# Patient Record
Sex: MALE | Race: WHITE | NOT HISPANIC OR LATINO | Employment: UNEMPLOYED | URBAN - NONMETROPOLITAN AREA
[De-identification: names, ages, dates, MRNs, and addresses within clinical notes are randomized per-mention and may not be internally consistent; named-entity substitution may affect disease eponyms.]

---

## 2017-04-20 ENCOUNTER — APPOINTMENT (OUTPATIENT)
Dept: CT IMAGING | Facility: HOSPITAL | Age: 22
End: 2017-04-20

## 2017-04-20 ENCOUNTER — HOSPITAL ENCOUNTER (OUTPATIENT)
Facility: HOSPITAL | Age: 22
Setting detail: OBSERVATION
Discharge: LEFT BEFORE DISCHARGE | End: 2017-04-21
Attending: EMERGENCY MEDICINE | Admitting: HOSPITALIST

## 2017-04-20 ENCOUNTER — APPOINTMENT (OUTPATIENT)
Dept: GENERAL RADIOLOGY | Facility: HOSPITAL | Age: 22
End: 2017-04-20

## 2017-04-20 DIAGNOSIS — L03.114 CELLULITIS OF LEFT UPPER EXTREMITY: Primary | ICD-10-CM

## 2017-04-20 DIAGNOSIS — J18.9 PNEUMONIA OF RIGHT MIDDLE LOBE DUE TO INFECTIOUS ORGANISM: ICD-10-CM

## 2017-04-20 LAB
ALBUMIN SERPL-MCNC: 4 G/DL (ref 3.4–4.8)
ALBUMIN/GLOB SERPL: 1.3 G/DL (ref 1.1–1.8)
ALP SERPL-CCNC: 63 U/L (ref 38–126)
ALT SERPL W P-5'-P-CCNC: 26 U/L (ref 21–72)
ANION GAP SERPL CALCULATED.3IONS-SCNC: 14 MMOL/L (ref 5–15)
AST SERPL-CCNC: 21 U/L (ref 17–59)
B PERT DNA SPEC QL NAA+PROBE: NOT DETECTED
BASOPHILS # BLD AUTO: 0.02 10*3/MM3 (ref 0–0.2)
BASOPHILS NFR BLD AUTO: 0.1 % (ref 0–2)
BILIRUB SERPL-MCNC: 1.2 MG/DL (ref 0.2–1.3)
BUN BLD-MCNC: 13 MG/DL (ref 7–21)
BUN/CREAT SERPL: 16.3 (ref 7–25)
C PNEUM DNA NPH QL NAA+NON-PROBE: NOT DETECTED
CALCIUM SPEC-SCNC: 9 MG/DL (ref 8.4–10.2)
CHLORIDE SERPL-SCNC: 93 MMOL/L (ref 95–110)
CO2 SERPL-SCNC: 22 MMOL/L (ref 22–31)
CREAT BLD-MCNC: 0.8 MG/DL (ref 0.7–1.3)
D-LACTATE SERPL-SCNC: 1.8 MMOL/L (ref 0.5–2)
DEPRECATED RDW RBC AUTO: 40.3 FL (ref 35.1–43.9)
EOSINOPHIL # BLD AUTO: 0 10*3/MM3 (ref 0–0.7)
EOSINOPHIL NFR BLD AUTO: 0 % (ref 0–7)
ERYTHROCYTE [DISTWIDTH] IN BLOOD BY AUTOMATED COUNT: 13.4 % (ref 11.5–14.5)
FLUAV H1 2009 PAND RNA NPH QL NAA+PROBE: NOT DETECTED
FLUAV H1 HA GENE NPH QL NAA+PROBE: NOT DETECTED
FLUAV H3 RNA NPH QL NAA+PROBE: NOT DETECTED
FLUAV SUBTYP SPEC NAA+PROBE: NOT DETECTED
FLUBV RNA ISLT QL NAA+PROBE: NOT DETECTED
GFR SERPL CREATININE-BSD FRML MDRD: 121 ML/MIN/1.73 (ref 77–179)
GLOBULIN UR ELPH-MCNC: 3 GM/DL (ref 2.3–3.5)
GLUCOSE BLD-MCNC: 124 MG/DL (ref 60–100)
HADV DNA SPEC NAA+PROBE: NOT DETECTED
HCOV 229E RNA SPEC QL NAA+PROBE: NOT DETECTED
HCOV HKU1 RNA SPEC QL NAA+PROBE: NOT DETECTED
HCOV NL63 RNA SPEC QL NAA+PROBE: NOT DETECTED
HCOV OC43 RNA SPEC QL NAA+PROBE: NOT DETECTED
HCT VFR BLD AUTO: 41.4 % (ref 39–49)
HGB BLD-MCNC: 14.7 G/DL (ref 13.7–17.3)
HMPV RNA NPH QL NAA+NON-PROBE: NOT DETECTED
HOLD SPECIMEN: NORMAL
HOLD SPECIMEN: NORMAL
HPIV1 RNA SPEC QL NAA+PROBE: NOT DETECTED
HPIV2 RNA SPEC QL NAA+PROBE: NOT DETECTED
HPIV3 RNA NPH QL NAA+PROBE: NOT DETECTED
HPIV4 P GENE NPH QL NAA+PROBE: NOT DETECTED
IMM GRANULOCYTES # BLD: 0.12 10*3/MM3 (ref 0–0.02)
IMM GRANULOCYTES NFR BLD: 0.5 % (ref 0–0.5)
L PNEUMO1 AG UR QL IA: NEGATIVE
LYMPHOCYTES # BLD AUTO: 1.36 10*3/MM3 (ref 0.6–4.2)
LYMPHOCYTES NFR BLD AUTO: 6.1 % (ref 10–50)
M PNEUMO IGG SER IA-ACNC: NOT DETECTED
MCH RBC QN AUTO: 29.6 PG (ref 26.5–34)
MCHC RBC AUTO-ENTMCNC: 35.5 G/DL (ref 31.5–36.3)
MCV RBC AUTO: 83.5 FL (ref 80–98)
MONOCYTES # BLD AUTO: 2.75 10*3/MM3 (ref 0–0.9)
MONOCYTES NFR BLD AUTO: 12.4 % (ref 0–12)
NEUTROPHILS # BLD AUTO: 17.87 10*3/MM3 (ref 2–8.6)
NEUTROPHILS NFR BLD AUTO: 80.9 % (ref 37–80)
PLATELET # BLD AUTO: 225 10*3/MM3 (ref 150–450)
PMV BLD AUTO: 10.3 FL (ref 8–12)
POTASSIUM BLD-SCNC: 4.3 MMOL/L (ref 3.5–5.1)
PROT SERPL-MCNC: 7 G/DL (ref 6.3–8.6)
RBC # BLD AUTO: 4.96 10*6/MM3 (ref 4.37–5.74)
RHINOVIRUS RNA SPEC NAA+PROBE: NOT DETECTED
RSV RNA NPH QL NAA+NON-PROBE: NOT DETECTED
S PNEUM AG SPEC QL LA: NEGATIVE
SODIUM BLD-SCNC: 129 MMOL/L (ref 137–145)
WBC NRBC COR # BLD: 22.12 10*3/MM3 (ref 3.2–9.8)
WHOLE BLOOD HOLD SPECIMEN: NORMAL
WHOLE BLOOD HOLD SPECIMEN: NORMAL

## 2017-04-20 PROCEDURE — 25010000002 VANCOMYCIN PER 500 MG: Performed by: NURSE PRACTITIONER

## 2017-04-20 PROCEDURE — G0378 HOSPITAL OBSERVATION PER HR: HCPCS

## 2017-04-20 PROCEDURE — 25010000002 KETOROLAC TROMETHAMINE PER 15 MG: Performed by: FAMILY MEDICINE

## 2017-04-20 PROCEDURE — 94760 N-INVAS EAR/PLS OXIMETRY 1: CPT

## 2017-04-20 PROCEDURE — 87077 CULTURE AEROBIC IDENTIFY: CPT | Performed by: NURSE PRACTITIONER

## 2017-04-20 PROCEDURE — 87486 CHLMYD PNEUM DNA AMP PROBE: CPT | Performed by: FAMILY MEDICINE

## 2017-04-20 PROCEDURE — 96375 TX/PRO/DX INJ NEW DRUG ADDON: CPT

## 2017-04-20 PROCEDURE — 87147 CULTURE TYPE IMMUNOLOGIC: CPT | Performed by: NURSE PRACTITIONER

## 2017-04-20 PROCEDURE — 73090 X-RAY EXAM OF FOREARM: CPT

## 2017-04-20 PROCEDURE — 87040 BLOOD CULTURE FOR BACTERIA: CPT | Performed by: NURSE PRACTITIONER

## 2017-04-20 PROCEDURE — 83605 ASSAY OF LACTIC ACID: CPT | Performed by: NURSE PRACTITIONER

## 2017-04-20 PROCEDURE — 25010000002 CEFTRIAXONE: Performed by: FAMILY MEDICINE

## 2017-04-20 PROCEDURE — 87186 SC STD MICRODIL/AGAR DIL: CPT | Performed by: NURSE PRACTITIONER

## 2017-04-20 PROCEDURE — 99243 OFF/OP CNSLTJ NEW/EST LOW 30: CPT | Performed by: ORTHOPAEDIC SURGERY

## 2017-04-20 PROCEDURE — 73200 CT UPPER EXTREMITY W/O DYE: CPT

## 2017-04-20 PROCEDURE — 96376 TX/PRO/DX INJ SAME DRUG ADON: CPT

## 2017-04-20 PROCEDURE — 25010000002 LEVOFLOXACIN PER 250 MG: Performed by: NURSE PRACTITIONER

## 2017-04-20 PROCEDURE — 94640 AIRWAY INHALATION TREATMENT: CPT

## 2017-04-20 PROCEDURE — 87449 NOS EACH ORGANISM AG IA: CPT | Performed by: FAMILY MEDICINE

## 2017-04-20 PROCEDURE — 99284 EMERGENCY DEPT VISIT MOD MDM: CPT

## 2017-04-20 PROCEDURE — 25010000002 ONDANSETRON PER 1 MG: Performed by: NURSE PRACTITIONER

## 2017-04-20 PROCEDURE — 25010000002 AZITHROMYCIN: Performed by: FAMILY MEDICINE

## 2017-04-20 PROCEDURE — 93010 ELECTROCARDIOGRAM REPORT: CPT | Performed by: INTERNAL MEDICINE

## 2017-04-20 PROCEDURE — 25010000002 MORPHINE PER 10 MG: Performed by: NURSE PRACTITIONER

## 2017-04-20 PROCEDURE — 87633 RESP VIRUS 12-25 TARGETS: CPT | Performed by: FAMILY MEDICINE

## 2017-04-20 PROCEDURE — 87581 M.PNEUMON DNA AMP PROBE: CPT | Performed by: FAMILY MEDICINE

## 2017-04-20 PROCEDURE — 71020 HC CHEST PA AND LATERAL: CPT

## 2017-04-20 PROCEDURE — 94799 UNLISTED PULMONARY SVC/PX: CPT

## 2017-04-20 PROCEDURE — 87798 DETECT AGENT NOS DNA AMP: CPT | Performed by: FAMILY MEDICINE

## 2017-04-20 PROCEDURE — 96365 THER/PROPH/DIAG IV INF INIT: CPT

## 2017-04-20 PROCEDURE — 96367 TX/PROPH/DG ADDL SEQ IV INF: CPT

## 2017-04-20 PROCEDURE — 87150 DNA/RNA AMPLIFIED PROBE: CPT | Performed by: NURSE PRACTITIONER

## 2017-04-20 PROCEDURE — 25010000002 HYDROMORPHONE PER 4 MG: Performed by: NURSE PRACTITIONER

## 2017-04-20 PROCEDURE — 87899 AGENT NOS ASSAY W/OPTIC: CPT | Performed by: FAMILY MEDICINE

## 2017-04-20 PROCEDURE — 85025 COMPLETE CBC W/AUTO DIFF WBC: CPT | Performed by: NURSE PRACTITIONER

## 2017-04-20 PROCEDURE — 80053 COMPREHEN METABOLIC PANEL: CPT | Performed by: NURSE PRACTITIONER

## 2017-04-20 PROCEDURE — 93005 ELECTROCARDIOGRAM TRACING: CPT | Performed by: NURSE PRACTITIONER

## 2017-04-20 RX ORDER — NICOTINE 21 MG/24HR
1 PATCH, TRANSDERMAL 24 HOURS TRANSDERMAL EVERY 24 HOURS
Status: DISCONTINUED | OUTPATIENT
Start: 2017-04-20 | End: 2017-04-21 | Stop reason: HOSPADM

## 2017-04-20 RX ORDER — MORPHINE SULFATE 4 MG/ML
4 INJECTION, SOLUTION INTRAMUSCULAR; INTRAVENOUS ONCE
Status: COMPLETED | OUTPATIENT
Start: 2017-04-20 | End: 2017-04-20

## 2017-04-20 RX ORDER — KETOROLAC TROMETHAMINE 30 MG/ML
30 INJECTION, SOLUTION INTRAMUSCULAR; INTRAVENOUS EVERY 6 HOURS PRN
Status: DISCONTINUED | OUTPATIENT
Start: 2017-04-20 | End: 2017-04-21 | Stop reason: HOSPADM

## 2017-04-20 RX ORDER — ONDANSETRON 2 MG/ML
4 INJECTION INTRAMUSCULAR; INTRAVENOUS ONCE
Status: COMPLETED | OUTPATIENT
Start: 2017-04-20 | End: 2017-04-20

## 2017-04-20 RX ORDER — IPRATROPIUM BROMIDE AND ALBUTEROL SULFATE 2.5; .5 MG/3ML; MG/3ML
3 SOLUTION RESPIRATORY (INHALATION) EVERY 4 HOURS PRN
Status: DISCONTINUED | OUTPATIENT
Start: 2017-04-20 | End: 2017-04-21 | Stop reason: HOSPADM

## 2017-04-20 RX ORDER — SODIUM CHLORIDE 0.9 % (FLUSH) 0.9 %
10 SYRINGE (ML) INJECTION AS NEEDED
Status: DISCONTINUED | OUTPATIENT
Start: 2017-04-20 | End: 2017-04-21 | Stop reason: HOSPADM

## 2017-04-20 RX ORDER — SODIUM CHLORIDE 0.9 % (FLUSH) 0.9 %
1-10 SYRINGE (ML) INJECTION AS NEEDED
Status: DISCONTINUED | OUTPATIENT
Start: 2017-04-20 | End: 2017-04-21 | Stop reason: HOSPADM

## 2017-04-20 RX ORDER — IPRATROPIUM BROMIDE AND ALBUTEROL SULFATE 2.5; .5 MG/3ML; MG/3ML
3 SOLUTION RESPIRATORY (INHALATION)
Status: DISCONTINUED | OUTPATIENT
Start: 2017-04-20 | End: 2017-04-21 | Stop reason: HOSPADM

## 2017-04-20 RX ORDER — ONDANSETRON 2 MG/ML
4 INJECTION INTRAMUSCULAR; INTRAVENOUS EVERY 6 HOURS PRN
Status: DISCONTINUED | OUTPATIENT
Start: 2017-04-20 | End: 2017-04-21 | Stop reason: HOSPADM

## 2017-04-20 RX ORDER — SODIUM CHLORIDE 9 MG/ML
100 INJECTION, SOLUTION INTRAVENOUS CONTINUOUS
Status: DISCONTINUED | OUTPATIENT
Start: 2017-04-20 | End: 2017-04-21 | Stop reason: HOSPADM

## 2017-04-20 RX ORDER — ACETAMINOPHEN 500 MG
1000 TABLET ORAL ONCE
Status: COMPLETED | OUTPATIENT
Start: 2017-04-20 | End: 2017-04-20

## 2017-04-20 RX ORDER — DOCUSATE SODIUM 100 MG/1
100 CAPSULE, LIQUID FILLED ORAL 2 TIMES DAILY
Status: DISCONTINUED | OUTPATIENT
Start: 2017-04-20 | End: 2017-04-21 | Stop reason: HOSPADM

## 2017-04-20 RX ORDER — LEVOFLOXACIN 5 MG/ML
500 INJECTION, SOLUTION INTRAVENOUS ONCE
Status: COMPLETED | OUTPATIENT
Start: 2017-04-20 | End: 2017-04-20

## 2017-04-20 RX ORDER — ACETAMINOPHEN 325 MG/1
650 TABLET ORAL EVERY 4 HOURS PRN
Status: DISCONTINUED | OUTPATIENT
Start: 2017-04-20 | End: 2017-04-21 | Stop reason: HOSPADM

## 2017-04-20 RX ADMIN — KETOROLAC TROMETHAMINE 30 MG: 30 INJECTION, SOLUTION INTRAMUSCULAR; INTRAVENOUS at 22:44

## 2017-04-20 RX ADMIN — MORPHINE SULFATE 4 MG: 4 INJECTION, SOLUTION INTRAMUSCULAR; INTRAVENOUS at 10:39

## 2017-04-20 RX ADMIN — VANCOMYCIN HYDROCHLORIDE 1250 MG: 1 INJECTION, POWDER, LYOPHILIZED, FOR SOLUTION INTRAVENOUS at 11:26

## 2017-04-20 RX ADMIN — LEVOFLOXACIN 500 MG: 5 INJECTION, SOLUTION INTRAVENOUS at 12:34

## 2017-04-20 RX ADMIN — ONDANSETRON 4 MG: 2 INJECTION INTRAMUSCULAR; INTRAVENOUS at 10:39

## 2017-04-20 RX ADMIN — HYDROMORPHONE HYDROCHLORIDE 1 MG: 1 INJECTION, SOLUTION INTRAMUSCULAR; INTRAVENOUS; SUBCUTANEOUS at 12:10

## 2017-04-20 RX ADMIN — SODIUM CHLORIDE 2313 ML: 900 INJECTION, SOLUTION INTRAVENOUS at 10:15

## 2017-04-20 RX ADMIN — ACETAMINOPHEN 1000 MG: 500 TABLET ORAL at 12:33

## 2017-04-20 RX ADMIN — DOCUSATE SODIUM 100 MG: 100 CAPSULE, LIQUID FILLED ORAL at 17:13

## 2017-04-20 RX ADMIN — Medication 10 ML: at 17:14

## 2017-04-20 RX ADMIN — KETOROLAC TROMETHAMINE 30 MG: 30 INJECTION, SOLUTION INTRAMUSCULAR; INTRAVENOUS at 16:18

## 2017-04-20 RX ADMIN — IPRATROPIUM BROMIDE AND ALBUTEROL SULFATE 3 ML: 2.5; .5 SOLUTION RESPIRATORY (INHALATION) at 19:40

## 2017-04-20 RX ADMIN — NICOTINE 1 PATCH: 21 PATCH TRANSDERMAL at 15:15

## 2017-04-20 NOTE — PROGRESS NOTES
"Pharmacokinetics by Pharmacy - Vancomycin    Javed Hair is a 21 y.o. male   [Ht: 72\" (182.9 cm); Wt: 163 lb 9.6 oz (74.2 kg)]    Estimated Creatinine Clearance: 152 mL/min (by C-G formula based on Cr of 0.8).   Lab Results   Component Value Date    CREATININE 0.80 04/20/2017      Lab Results   Component Value Date    WBC 22.12 (H) 04/20/2017      Temp Readings from Last 1 Encounters:   04/20/17 97.8 °F (36.6 °C) (Temporal Artery )       Indication for use: cellulitis of left upper extremity     Baseline culture results:  Microbiology Results (last 10 days)       ** No results found for the last 240 hours. **               Assessment/Plan  Initiated Vancomycin 1250 mg x 1 dose then 1000mg IVPB Q8H. Will order Vancomycin trough level 04/21 at 1130.  Pharmacy will monitor renal function and adjust dose accordingly.    Ailyn Isabel [facility]  04/20/17 3:35 PM     "

## 2017-04-20 NOTE — CONSULTS
Patient Name:  Javed Hair  Admit Date:  4/20/2017  Consult Date:  4/20/2017      Referring Provider:  Grant Hart MD  Reason for Consultation:  Cellulitis left arm    Patient Care Team:  No Known Provider as PCP - General    Chief complaint:    Left arm pain    Subjective .     History of present illness:  Patient reports about a 3 day history of increasing pain and swelling in left arm.  No specific injury noted.  He thinks it was a spider bite.  He went to urgent care but could not afford the medication prescribed.  Patient reports that the wound opened and started draining yesterday in shower but pain was still great and therefore came to ER today.  Consult was obtained to eval for possible incision and drainage, treatment options.     Review of Systems:  no chest pain, Shortness of air, GI or  disturbarnces.  all other systems reviewed as negative.    History  Past Medical History:   Diagnosis Date   • ADHD (attention deficit hyperactivity disorder)     as a child   ,   Past Surgical History:   Procedure Laterality Date   • TESTICLE TORSION REPAIR     ,   Family History   Problem Relation Age of Onset   • Hypertension Mother    • Diabetes Paternal Grandmother    ,   Social History   Substance Use Topics   • Smoking status: Current Every Day Smoker     Packs/day: 1.00     Types: Cigarettes   • Smokeless tobacco: Never Used   • Alcohol use No   ,   Prescriptions Prior to Admission   Medication Sig Dispense Refill Last Dose   • clindamycin (CLEOCIN) 300 MG capsule Take 1 capsule by mouth 4 (Four) Times a Day. 40 capsule 0 Unknown at Unknown time    and Allergies:  Penicillins    Objective     Vital Signs   Temp:  [97.8 °F (36.6 °C)-100.7 °F (38.2 °C)] 99.7 °F (37.6 °C)  Heart Rate:  [120-139] 120  Resp:  [18-26] 18  BP: (130-145)/(75-89) 135/78    Physical Exam:   General Appearance alert, pleasant, appears stated age and cooperative  Extremities:   Right UE:  Good ROM, nontender on exam, Good distal  pulses and sensation, stable joint exam, good muscle tone and strength.    Left UE:  Good ROM, tender to exam along forearm.  Significant swelling from elbow to wrist.  Mild erythema.  Small, draining wound on Dorsolateral aspect of forearm.  Stable joint exam.  FUll finger motion.  Good muscle tone and strength.  Good distal pulses and sensation  Pulses Pulses palpable and equal bilaterally      Results Review:    Imaging Results (last 24 hours)     Procedure Component Value Units Date/Time    XR Chest 2 View [[id number]] Collected:  04/20/17 1039     Updated:  04/20/17 1044    Narrative:         [facility] [place]    Patient Name: MR. TRISH CORTEZ    ORDERING: DONNELL BENEDICT     ATTENDING: SHAGGY WATTERS     REFERRING: DONNELL BENEDICT    -----------------------    PROCEDURE: Two-view chest    COMPARISON: None.    HISTORY: Simple Sepsis triage protocol    FINDINGS: Frontal and lateral views of the chest are obtained.     Devices: None    Lungs/Pleura: Right midlung zone contains a small focal area of  opacification with air bronchograms which could represent  pneumonia. Lungs otherwise appear clear.    Cardiomediastinal structures: Normal         Impression:       CONCLUSION:    Right midlung zone contains a small focal area of opacification  with air bronchograms which could represent pneumonia.    Electronically signed by:  Oz Wild MD  4/20/2017 10:42 AM  CDT Workstation: [id number]    XR Forearm 2 View Left [[id number]] Collected:  04/20/17 1042     Updated:  04/20/17 1049    Narrative:       [facility] [place]    Patient Name: TRISH CORTZE    ORDERING: DONNELL BENEDICT    ATTENDING: SHAGGY WATTERS     REFERRING: DONNELL BENEDICT    -----------------------      PROCEDURE: AP and lateral left forearm    COMPARISON: No comparison    HISTORY: pain    FINDINGS: No acute fracture, subluxation or focal osseous lesion.      Impression:       CONCLUSION:    No radiographic evidence of  acute fracture    Electronically signed by:  Oz Wild MD  4/20/2017 10:48 AM  CDT Workstation: [id number]    CT Upper Extremity Left Without Contrast [[id number]] Updated:  04/20/17 1833          Lab Results (last 24 hours)     Procedure Component Value Units Date/Time    Blood Culture [[id number]] Collected:  04/20/17 1017    Specimen:  Blood from Arm, Right Updated:  04/20/17 1024    CBC Auto Differential [[id number]]  (Abnormal) Collected:  04/20/17 1017    Specimen:  Blood Updated:  04/20/17 1028     WBC 22.12 (H) 10*3/mm3      RBC 4.96 10*6/mm3      Hemoglobin 14.7 g/dL      Hematocrit 41.4 %      MCV 83.5 fL      MCH 29.6 pg      MCHC 35.5 g/dL      RDW 13.4 %      RDW-SD 40.3 fl      MPV 10.3 fL      Platelets 225 10*3/mm3      Neutrophil % 80.9 (H) %      Lymphocyte % 6.1 (L) %      Monocyte % 12.4 (H) %      Eosinophil % 0.0 %      Basophil % 0.1 %      Immature Grans % 0.5 %      Neutrophils, Absolute 17.87 (H) 10*3/mm3      Lymphocytes, Absolute 1.36 10*3/mm3      Monocytes, Absolute 2.75 (H) 10*3/mm3      Eosinophils, Absolute 0.00 10*3/mm3      Basophils, Absolute 0.02 10*3/mm3      Immature Grans, Absolute 0.12 (H) 10*3/mm3     CBC & Differential [[id number]] Collected:  04/20/17 1017    Specimen:  Blood Updated:  04/20/17 1029    Narrative:       The following orders were created for panel order CBC & Differential.  Procedure                               Abnormality         Status                     ---------                               -----------         ------                     Scan Slide[[id number]]                                                                   CBC Auto Differential[[id number]]         Abnormal            Final result                 Please view results for these tests on the individual orders.    Lactic Acid, Plasma [[id number]]  (Normal) Collected:  04/20/17 1017    Specimen:  Blood Updated:  04/20/17 1043     Lactate 1.8 mmol/L     Blood Culture [[id number]] Collected:  04/20/17  1057    Specimen:  Blood from Arm, Right Updated:  04/20/17 1106    Comprehensive Metabolic Panel [[id number]]  (Abnormal) Collected:  04/20/17 1017    Specimen:  Blood Updated:  04/20/17 1114     Glucose 124 (H) mg/dL      BUN 13 mg/dL      Creatinine 0.80 mg/dL      Sodium 129 (L) mmol/L      Potassium 4.3 mmol/L      Chloride 93 (L) mmol/L      CO2 22.0 mmol/L      Calcium 9.0 mg/dL      Total Protein 7.0 g/dL      Albumin 4.00 g/dL      ALT (SGPT) 26 U/L      AST (SGOT) 21 U/L      Alkaline Phosphatase 63 U/L      Total Bilirubin 1.2 mg/dL      eGFR Non African Amer 121 mL/min/1.73      Globulin 3.0 gm/dL      A/G Ratio 1.3 g/dL      BUN/Creatinine Ratio 16.3     Anion Gap 14.0 mmol/L     Light Blue Top [[id number]] Collected:  04/20/17 1017    Specimen:  Blood Updated:  04/20/17 1201     Extra Tube hold for add-on      Auto resulted       Lavender Top [[id number]] Collected:  04/20/17 1017    Specimen:  Blood Updated:  04/20/17 1201     Extra Tube hold for add-on      Auto resulted       Gold Top - SST [[id number]] Collected:  04/20/17 1017    Specimen:  Blood Updated:  04/20/17 1201     Extra Tube Hold for add-ons.      Auto resulted.       [place] Draw [[id number]] Collected:  04/20/17 1017    Specimen:  Blood Updated:  04/20/17 1201    Narrative:       The following orders were created for panel order [place] Draw.  Procedure                               Abnormality         Status                     ---------                               -----------         ------                     Light Blue Top[[id number]]                                    Final result               Green Top (Gel)[[id number]]                                   Final result               Lavender Top[[id number]]                                      Final result               Gold Top - SST[[id number]]                                    Final result                 Please view results for these tests on the individual orders.    Green Top (Gel)  [[id number]] Collected:  04/20/17 1017    Specimen:  Blood Updated:  04/20/17 1201     Extra Tube Hold for add-ons.      Auto resulted.       Legionella Antigen, Urine [[id number]] Collected:  04/20/17 1815    Specimen:  Urine from Urine, Clean Catch Updated:  04/20/17 1815    S. Pneumo Ag Urine or CSF [[id number]] Collected:  04/20/17 1815    Specimen:  Urine from Urine, Clean Catch Updated:  04/20/17 1815    Respiratory Panel, PCR [[id number]] Collected:  04/20/17 1826    Specimen:  Swab from Nares Updated:  04/20/17 1826           I reviewed the patient's new clinical results.  I reviewed the patient's new imaging results and agree with the interpretation.      Assessment/Plan     Active Problems:    Cellulitis of left upper extremity      Agree with IV antibiotics.  Would start with epsom salt soaks and finger motion as tolerated.  Since the wound is draining will expect swelling to markedly improve and do not expect that any further intervention will be necessary.  Will re-assess in the AM but do not anticipate any orthopedic intervention will be needed.    I discussed the patients findings and my recommendations with patient, nursing staff and consulting provider    Christian Villela MD  04/20/17  7:00 PM

## 2017-04-20 NOTE — NURSING NOTE
[facility] Medicine Admission  educational purposes only/not billing     Date of Admission: 4/20/2017    Patient Care Team:  No Known Provider as PCP - General  Dr. Hart, Hospitalist       Chief Complaint: left arm pain/bite and chest tenderness when breathing      HPI: 21 year old male presenting to the emergency room for cellulitis of left arm most likely from a spider bite. He states that 3 nights ago he woke up to what he thought was a spider bite although did not see a spider. He states a positive history of seeing brown recluse spiders in his home. The left arm is tender, warm, and painful to the touch from proximal elbow to the left wrist. There is notable swelling. He states some mild tingling/numbness in his left hand fingers and has a strong grasp and ability to move all fingers. He states he also has the shortness of breath and chest tenderness/pain when he takes deep breaths. Positive for recent non productive frequent cough. Denies hemoptysis. Other associated symptoms, he vomited 3-4 times yesterday which appeared as green/yellow bile. Poor appetite and has not ate anything for the last 3 days. He denies diarrhea. Last bowel movement 5 days ago.

## 2017-04-20 NOTE — ED PROVIDER NOTES
Subjective   HPI Comments: onset pain in left arm 3 days ago, thought it was a spider bite. Went to urgent care 2 days ago but was unable to afford the medication. Now pain is worse, had fever, + n/v, decreased appetite. +smoker. No PCP. He is left hand dominant.      History provided by:  Patient      Review of Systems   Constitutional: Negative.    HENT: Negative.    Eyes: Negative.    Respiratory: Negative.    Cardiovascular: Negative.    Gastrointestinal: Positive for nausea and vomiting.   Genitourinary: Negative.    Musculoskeletal: Negative.    Skin: Positive for wound.   Neurological: Negative.    Psychiatric/Behavioral: Negative.        Past Medical History:   Diagnosis Date   • ADHD (attention deficit hyperactivity disorder)     as a child       Allergies   Allergen Reactions   • Penicillins Rash       Past Surgical History:   Procedure Laterality Date   • TESTICLE TORSION REPAIR         Family History   Problem Relation Age of Onset   • Hypertension Mother    • Diabetes Paternal Grandmother        Social History     Social History   • Marital status: Single     Spouse name: N/A   • Number of children: N/A   • Years of education: N/A     Social History Main Topics   • Smoking status: Current Every Day Smoker     Packs/day: 1.00     Types: Cigarettes   • Smokeless tobacco: Never Used   • Alcohol use No   • Drug use: No   • Sexual activity: Defer     Other Topics Concern   • None     Social History Narrative   • None           Objective   Physical Exam   Constitutional: He is oriented to person, place, and time. He appears well-developed and well-nourished.   HENT:   Head: Normocephalic.   Eyes: EOM are normal. Pupils are equal, round, and reactive to light.   Neck: Normal range of motion. Neck supple.   Cardiovascular:   tachycardia   Pulmonary/Chest: Effort normal.   Slightly diminished on right    Abdominal: Soft. Bowel sounds are normal.   Musculoskeletal: He exhibits edema and tenderness.   Left forearm  "with edema and erythema. Open wound on lateral forearm 1 cm with drainage. Pulses 2+ in radial and all peripheral pulses.   Neurological: He is alert and oriented to person, place, and time.   Skin: Skin is warm and dry.   Nursing note and vitals reviewed.  /78 (BP Location: Right arm, Patient Position: Lying)  Pulse 120  Temp 99.7 °F (37.6 °C) (Temporal Artery )   Resp 18  Ht 72\" (182.9 cm)  Wt 163 lb 9.6 oz (74.2 kg)  SpO2 96%  BMI 22.19 kg/m2      ECG 12 Lead    Date/Time: 4/20/2017 10:52 AM  Performed by: DONNELL BENEDICT  Authorized by: DONNELL BENEDICT   Interpreted by physician  Previous ECG: no previous ECG available  BPM: 125  Clinical impression: abnormal ECG               ED Course  ED Course      Results for orders placed or performed during the hospital encounter of 04/20/17   Comprehensive Metabolic Panel   Result Value Ref Range    Glucose 124 (H) 60 - 100 mg/dL    BUN 13 7 - 21 mg/dL    Creatinine 0.80 0.70 - 1.30 mg/dL    Sodium 129 (L) 137 - 145 mmol/L    Potassium 4.3 3.5 - 5.1 mmol/L    Chloride 93 (L) 95 - 110 mmol/L    CO2 22.0 22.0 - 31.0 mmol/L    Calcium 9.0 8.4 - 10.2 mg/dL    Total Protein 7.0 6.3 - 8.6 g/dL    Albumin 4.00 3.40 - 4.80 g/dL    ALT (SGPT) 26 21 - 72 U/L    AST (SGOT) 21 17 - 59 U/L    Alkaline Phosphatase 63 38 - 126 U/L    Total Bilirubin 1.2 0.2 - 1.3 mg/dL    eGFR Non  Amer 121 77 - 179 mL/min/1.73    Globulin 3.0 2.3 - 3.5 gm/dL    A/G Ratio 1.3 1.1 - 1.8 g/dL    BUN/Creatinine Ratio 16.3 7.0 - 25.0    Anion Gap 14.0 5.0 - 15.0 mmol/L   Lactic Acid, Plasma   Result Value Ref Range    Lactate 1.8 0.5 - 2.0 mmol/L   CBC Auto Differential   Result Value Ref Range    WBC 22.12 (H) 3.20 - 9.80 10*3/mm3    RBC 4.96 4.37 - 5.74 10*6/mm3    Hemoglobin 14.7 13.7 - 17.3 g/dL    Hematocrit 41.4 39.0 - 49.0 %    MCV 83.5 80.0 - 98.0 fL    MCH 29.6 26.5 - 34.0 pg    MCHC 35.5 31.5 - 36.3 g/dL    RDW 13.4 11.5 - 14.5 %    RDW-SD 40.3 35.1 - 43.9 fl    MPV 10.3 8.0 - " 12.0 fL    Platelets 225 150 - 450 10*3/mm3    Neutrophil % 80.9 (H) 37.0 - 80.0 %    Lymphocyte % 6.1 (L) 10.0 - 50.0 %    Monocyte % 12.4 (H) 0.0 - 12.0 %    Eosinophil % 0.0 0.0 - 7.0 %    Basophil % 0.1 0.0 - 2.0 %    Immature Grans % 0.5 0.0 - 0.5 %    Neutrophils, Absolute 17.87 (H) 2.00 - 8.60 10*3/mm3    Lymphocytes, Absolute 1.36 0.60 - 4.20 10*3/mm3    Monocytes, Absolute 2.75 (H) 0.00 - 0.90 10*3/mm3    Eosinophils, Absolute 0.00 0.00 - 0.70 10*3/mm3    Basophils, Absolute 0.02 0.00 - 0.20 10*3/mm3    Immature Grans, Absolute 0.12 (H) 0.00 - 0.02 10*3/mm3   Light Blue Top   Result Value Ref Range    Extra Tube hold for add-on    Green Top (Gel)   Result Value Ref Range    Extra Tube Hold for add-ons.    Lavender Top   Result Value Ref Range    Extra Tube hold for add-on    Gold Top - SST   Result Value Ref Range    Extra Tube Hold for add-ons.          XR Forearm 2 View Left   Final Result   CONCLUSION:     No radiographic evidence of acute fracture      Electronically signed by:  Oz Wild MD  4/20/2017 10:48 AM   CDT Workstation: [id number]      XR Chest 2 View   Final Result   CONCLUSION:     Right midlung zone contains a small focal area of opacification   with air bronchograms which could represent pneumonia.      Electronically signed by:  Oz Wild MD  4/20/2017 10:42 AM   CDT Workstation: [id number]      CT Upper Extremity Left Without Contrast    (Results Pending)               MDM  Number of Diagnoses or Management Options  Cellulitis of left upper extremity:   Pneumonia of right middle lobe due to infectious organism:   Diagnosis management comments: C/o pain in left arm onset 3 days ago, seen at urgent care but was unable to fill the Rx. Left arm pain worse. No appetite. Left arm with cellulitis, decreased lung sounds in right. CXR with mid-lobe infiltrate. WBC 22. Fluid bolus 30 ml/kg, vancomycin started in ED. Dilaudid and zofran also given while in ED. Temp ou to 100.7 while in ED  , tylenol given. Spoke with Dr JUAN LUIS Gordillo and he will bring in OBV, cellulitis and pneumonia.      Final diagnoses:   Cellulitis of left upper extremity   Pneumonia of right middle lobe due to infectious organism            Thao Pineda, APRN  04/20/17 4698

## 2017-04-20 NOTE — PLAN OF CARE
Problem: Patient Care Overview (Adult)  Goal: Plan of Care Review  Outcome: Ongoing (interventions implemented as appropriate)    04/20/17 0333   Coping/Psychosocial Response Interventions   Plan Of Care Reviewed With patient   Patient Care Overview   Progress no change         Problem: Skin Integrity Impairment, Risk/Actual (Adult)  Goal: Identify Related Risk Factors and Signs and Symptoms  Outcome: Ongoing (interventions implemented as appropriate)  Goal: Skin Integrity/Wound Healing  Outcome: Ongoing (interventions implemented as appropriate)    Problem: Pain, Acute (Adult)  Goal: Identify Related Risk Factors and Signs and Symptoms  Outcome: Ongoing (interventions implemented as appropriate)  Goal: Acceptable Pain Control/Comfort Level  Outcome: Ongoing (interventions implemented as appropriate)

## 2017-04-21 VITALS
DIASTOLIC BLOOD PRESSURE: 64 MMHG | OXYGEN SATURATION: 98 % | TEMPERATURE: 98.7 F | HEIGHT: 72 IN | SYSTOLIC BLOOD PRESSURE: 148 MMHG | HEART RATE: 112 BPM | WEIGHT: 163.6 LBS | RESPIRATION RATE: 18 BRPM | BODY MASS INDEX: 22.16 KG/M2

## 2017-04-21 LAB
ANION GAP SERPL CALCULATED.3IONS-SCNC: 9 MMOL/L (ref 5–15)
BACTERIA BLD CULT: ABNORMAL
BASOPHILS # BLD AUTO: 0.01 10*3/MM3 (ref 0–0.2)
BASOPHILS NFR BLD AUTO: 0.1 % (ref 0–2)
BUN BLD-MCNC: 10 MG/DL (ref 7–21)
BUN/CREAT SERPL: 13.3 (ref 7–25)
CALCIUM SPEC-SCNC: 8.2 MG/DL (ref 8.4–10.2)
CHLORIDE SERPL-SCNC: 101 MMOL/L (ref 95–110)
CO2 SERPL-SCNC: 23 MMOL/L (ref 22–31)
CREAT BLD-MCNC: 0.75 MG/DL (ref 0.7–1.3)
DEPRECATED RDW RBC AUTO: 42 FL (ref 35.1–43.9)
EOSINOPHIL # BLD AUTO: 0.01 10*3/MM3 (ref 0–0.7)
EOSINOPHIL NFR BLD AUTO: 0.1 % (ref 0–7)
ERYTHROCYTE [DISTWIDTH] IN BLOOD BY AUTOMATED COUNT: 13.7 % (ref 11.5–14.5)
GFR SERPL CREATININE-BSD FRML MDRD: 130 ML/MIN/1.73 (ref 60–179)
GLUCOSE BLD-MCNC: 119 MG/DL (ref 60–100)
HCT VFR BLD AUTO: 34.7 % (ref 39–49)
HGB BLD-MCNC: 12.1 G/DL (ref 13.7–17.3)
IMM GRANULOCYTES # BLD: 0.21 10*3/MM3 (ref 0–0.02)
IMM GRANULOCYTES NFR BLD: 1.2 % (ref 0–0.5)
LYMPHOCYTES # BLD AUTO: 0.54 10*3/MM3 (ref 0.6–4.2)
LYMPHOCYTES NFR BLD AUTO: 3.2 % (ref 10–50)
MCH RBC QN AUTO: 29.4 PG (ref 26.5–34)
MCHC RBC AUTO-ENTMCNC: 34.9 G/DL (ref 31.5–36.3)
MCV RBC AUTO: 84.4 FL (ref 80–98)
MONOCYTES # BLD AUTO: 1.94 10*3/MM3 (ref 0–0.9)
MONOCYTES NFR BLD AUTO: 11.5 % (ref 0–12)
NEUTROPHILS # BLD AUTO: 14.19 10*3/MM3 (ref 2–8.6)
NEUTROPHILS NFR BLD AUTO: 83.9 % (ref 37–80)
PLATELET # BLD AUTO: 184 10*3/MM3 (ref 150–450)
PMV BLD AUTO: 11 FL (ref 8–12)
POTASSIUM BLD-SCNC: 3.7 MMOL/L (ref 3.5–5.1)
RBC # BLD AUTO: 4.11 10*6/MM3 (ref 4.37–5.74)
SODIUM BLD-SCNC: 133 MMOL/L (ref 137–145)
VANCOMYCIN TROUGH SERPL-MCNC: 8.87 MCG/ML (ref 10–15)
WBC NRBC COR # BLD: 16.9 10*3/MM3 (ref 3.2–9.8)

## 2017-04-21 PROCEDURE — 93005 ELECTROCARDIOGRAM TRACING: CPT | Performed by: INTERNAL MEDICINE

## 2017-04-21 PROCEDURE — 93010 ELECTROCARDIOGRAM REPORT: CPT | Performed by: INTERNAL MEDICINE

## 2017-04-21 PROCEDURE — 94799 UNLISTED PULMONARY SVC/PX: CPT

## 2017-04-21 PROCEDURE — 80202 ASSAY OF VANCOMYCIN: CPT | Performed by: INTERNAL MEDICINE

## 2017-04-21 PROCEDURE — 96376 TX/PRO/DX INJ SAME DRUG ADON: CPT

## 2017-04-21 PROCEDURE — 94760 N-INVAS EAR/PLS OXIMETRY 1: CPT

## 2017-04-21 PROCEDURE — 99241 PR OFFICE CONSULTATION NEW/ESTAB PATIENT 15 MIN: CPT | Performed by: ORTHOPAEDIC SURGERY

## 2017-04-21 PROCEDURE — 25010000002 KETOROLAC TROMETHAMINE PER 15 MG: Performed by: FAMILY MEDICINE

## 2017-04-21 PROCEDURE — 85025 COMPLETE CBC W/AUTO DIFF WBC: CPT | Performed by: FAMILY MEDICINE

## 2017-04-21 PROCEDURE — 80048 BASIC METABOLIC PNL TOTAL CA: CPT | Performed by: FAMILY MEDICINE

## 2017-04-21 PROCEDURE — G0378 HOSPITAL OBSERVATION PER HR: HCPCS

## 2017-04-21 RX ORDER — IBUPROFEN 600 MG/1
600 TABLET ORAL EVERY 4 HOURS PRN
Status: DISCONTINUED | OUTPATIENT
Start: 2017-04-21 | End: 2017-04-21 | Stop reason: HOSPADM

## 2017-04-21 RX ADMIN — KETOROLAC TROMETHAMINE 30 MG: 30 INJECTION, SOLUTION INTRAMUSCULAR; INTRAVENOUS at 04:03

## 2017-04-21 RX ADMIN — MAGNESIUM SULFATE 10 G: 1 CRYSTAL ORAL; TOPICAL at 10:08

## 2017-04-21 RX ADMIN — Medication 10 ML: at 09:05

## 2017-04-21 RX ADMIN — IBUPROFEN 600 MG: 600 TABLET ORAL at 03:11

## 2017-04-21 RX ADMIN — VANCOMYCIN HYDROCHLORIDE 1000 MG: 1 INJECTION, POWDER, LYOPHILIZED, FOR SOLUTION INTRAVENOUS at 11:54

## 2017-04-21 RX ADMIN — KETOROLAC TROMETHAMINE 30 MG: 30 INJECTION, SOLUTION INTRAMUSCULAR; INTRAVENOUS at 09:04

## 2017-04-21 RX ADMIN — VANCOMYCIN HYDROCHLORIDE 1000 MG: 1 INJECTION, POWDER, LYOPHILIZED, FOR SOLUTION INTRAVENOUS at 04:03

## 2017-04-21 RX ADMIN — IPRATROPIUM BROMIDE AND ALBUTEROL SULFATE 3 ML: 2.5; .5 SOLUTION RESPIRATORY (INHALATION) at 03:35

## 2017-04-21 RX ADMIN — DOCUSATE SODIUM 100 MG: 100 CAPSULE, LIQUID FILLED ORAL at 09:04

## 2017-04-21 RX ADMIN — ACETAMINOPHEN 650 MG: 325 TABLET ORAL at 12:05

## 2017-04-21 RX ADMIN — MAGNESIUM SULFATE 10 G: 1 CRYSTAL ORAL; TOPICAL at 04:04

## 2017-04-21 NOTE — PROGRESS NOTES
ORTHOPEDIC PROGRESS NOTE:    Name:  Javed Hair  Date:    4/21/2017  Date of admission:  4/20/2017    Chief Complaint:  Left arm pain    Subjective:  No new complaints.  Soreness improved.  Swelling improved.  Seems to be draining more.  No nausea    Vitals:    Vitals:    04/21/17 0815   BP: 148/64   Pulse: 112   Resp: 18   Temp: 98.7 °F (37.1 °C)   SpO2: 98%       Exam:  Awake, alert, and oriented.  Swelling improved in left arm.  Erythema improved.  Good finger motion  Good distal pulses and sensation.  Stable joint exam      ASSESSMENT:  Hospital Problem List     Cellulitis of left upper extremity            PLAN:  Continue with IV antibiotics.  Continue dressing changes.  Mobilize as tolerated.  Agree with treatment course.  followup with ortho only if needed.  No orthopedic intervention indicated.    04/21/17 at 12:44 PM by Christian Villela MD

## 2017-04-21 NOTE — H&P
[facility] Medicine Admission      Date of Admission: 4/20/2017      Primary Care Physician: No Known Provider      Chief Complaint: Left upper extremity swelling    HPI: Patient presents to ED with complaints of pain in his arm for the past 3 days. He states that on Monday, he noticed that he got bitten by an insect. Patient believes that it was most likely done by a spider, particularly a brown recluse spider. When inquiring further, he states that the day prior to the bite, he noticed a couple of brown recluse spiders in his household. Since the bite occurrred, patient has complained of swelling and increasing pain around the area. Today, his forearm had an increase in erythema and swelling increased as well. Therefore, he presented to the ED for further evaluation.     Past Medical History:  has a past medical history of ADHD (attention deficit hyperactivity disorder).    Past Surgical History:  has a past surgical history that includes Testicle Torsion Repair.    Family History: family history includes Diabetes in his paternal grandmother; Hypertension in his mother.    Social History:  reports that he has been smoking Cigarettes.  He has been smoking about 1.00 pack per day. He has never used smokeless tobacco. He reports that he does not drink alcohol or use illicit drugs.    Allergies:   Allergies   Allergen Reactions   • Penicillins Rash       Medications: Scheduled Meds:  azithromycin 500 mg Intravenous Q24H   ceftriaxone 1 g Intravenous Q24H   docusate sodium 100 mg Oral BID   ipratropium-albuterol 3 mL Nebulization Q4H - RT   nicotine 1 patch Transdermal Q24H   vancomycin 15 mg/kg Intravenous Q8H     Continuous Infusions:  Pharmacy to dose vancomycin     sodium chloride 100 mL/hr Last Rate: 100 mL/hr (04/21/17 0125)     PRN Meds:.•  acetaminophen  •  ipratropium-albuterol  •  ketorolac  •  ondansetron  •  Pharmacy to dose vancomycin  •  sodium chloride  •   sodium chloride    Review of Systems:  Review of Systems   Constitutional: Negative for appetite change, chills and fever.   HENT: Negative for congestion, ear pain, rhinorrhea, sneezing and sore throat.    Respiratory: Positive for cough and shortness of breath.    Cardiovascular: Negative for chest pain, palpitations and leg swelling.   Gastrointestinal: Negative for diarrhea, nausea and vomiting.   Endocrine: Negative for polyphagia and polyuria.   Musculoskeletal: Negative for back pain and neck pain.   Skin: Negative for color change and rash.   Neurological: Negative for dizziness, syncope and weakness.   Psychiatric/Behavioral: Negative for agitation, confusion and dysphoric mood.      Otherwise complete ROS is negative except as mentioned above.    Physical Exam:   Temp:  [97.8 °F (36.6 °C)-100.7 °F (38.2 °C)] 99.4 °F (37.4 °C)  Heart Rate:  [120-139] 132  Resp:  [16-26] 18  BP: (130-145)/(74-89) 133/74  Physical Exam   Constitutional: He is oriented to person, place, and time. He appears well-developed and well-nourished.   Cardiovascular: Normal rate, regular rhythm and normal heart sounds.  Exam reveals no gallop and no friction rub.    No murmur heard.  Pulmonary/Chest: Effort normal. No respiratory distress. He has wheezes. He has no rales.   Abdominal: Soft. Bowel sounds are normal. There is no tenderness.   Musculoskeletal: Normal range of motion. He exhibits no edema.   LUE erythema, swelling and tenderness present.    Neurological: He is alert and oriented to person, place, and time.   Skin: Skin is warm and dry.   Psychiatric: He has a normal mood and affect. His behavior is normal.   Vitals reviewed.        Results Reviewed:  I have personally reviewed current lab, radiology, and data and agree with results.  Lab Results (last 24 hours)     Procedure Component Value Units Date/Time    CBC Auto Differential [[id number]]  (Abnormal) Collected:  04/20/17 1017    Specimen:  Blood Updated:  04/20/17 1028      WBC 22.12 (H) 10*3/mm3      RBC 4.96 10*6/mm3      Hemoglobin 14.7 g/dL      Hematocrit 41.4 %      MCV 83.5 fL      MCH 29.6 pg      MCHC 35.5 g/dL      RDW 13.4 %      RDW-SD 40.3 fl      MPV 10.3 fL      Platelets 225 10*3/mm3      Neutrophil % 80.9 (H) %      Lymphocyte % 6.1 (L) %      Monocyte % 12.4 (H) %      Eosinophil % 0.0 %      Basophil % 0.1 %      Immature Grans % 0.5 %      Neutrophils, Absolute 17.87 (H) 10*3/mm3      Lymphocytes, Absolute 1.36 10*3/mm3      Monocytes, Absolute 2.75 (H) 10*3/mm3      Eosinophils, Absolute 0.00 10*3/mm3      Basophils, Absolute 0.02 10*3/mm3      Immature Grans, Absolute 0.12 (H) 10*3/mm3     CBC & Differential [[id number]] Collected:  04/20/17 1017    Specimen:  Blood Updated:  04/20/17 1029    Narrative:       The following orders were created for panel order CBC & Differential.  Procedure                               Abnormality         Status                     ---------                               -----------         ------                     Scan Slide[[id number]]                                                                   CBC Auto Differential[[id number]]         Abnormal            Final result                 Please view results for these tests on the individual orders.    Lactic Acid, Plasma [[id number]]  (Normal) Collected:  04/20/17 1017    Specimen:  Blood Updated:  04/20/17 1043     Lactate 1.8 mmol/L     Comprehensive Metabolic Panel [[id number]]  (Abnormal) Collected:  04/20/17 1017    Specimen:  Blood Updated:  04/20/17 1114     Glucose 124 (H) mg/dL      BUN 13 mg/dL      Creatinine 0.80 mg/dL      Sodium 129 (L) mmol/L      Potassium 4.3 mmol/L      Chloride 93 (L) mmol/L      CO2 22.0 mmol/L      Calcium 9.0 mg/dL      Total Protein 7.0 g/dL      Albumin 4.00 g/dL      ALT (SGPT) 26 U/L      AST (SGOT) 21 U/L      Alkaline Phosphatase 63 U/L      Total Bilirubin 1.2 mg/dL      eGFR Non African Amer 121 mL/min/1.73      Globulin 3.0 gm/dL       A/G Ratio 1.3 g/dL      BUN/Creatinine Ratio 16.3     Anion Gap 14.0 mmol/L     Light Blue Top [[id number]] Collected:  04/20/17 1017    Specimen:  Blood Updated:  04/20/17 1201     Extra Tube hold for add-on      Auto resulted       Lavender Top [[id number]] Collected:  04/20/17 1017    Specimen:  Blood Updated:  04/20/17 1201     Extra Tube hold for add-on      Auto resulted       Gold Top - SST [[id number]] Collected:  04/20/17 1017    Specimen:  Blood Updated:  04/20/17 1201     Extra Tube Hold for add-ons.      Auto resulted.       [place] Draw [[id number]] Collected:  04/20/17 1017    Specimen:  Blood Updated:  04/20/17 1201    Narrative:       The following orders were created for panel order [place] Draw.  Procedure                               Abnormality         Status                     ---------                               -----------         ------                     Light Blue Top[[id number]]                                    Final result               Green Top (Gel)[[id number]]                                   Final result               Lavender Top[[id number]]                                      Final result               Gold Top - SST[[id number]]                                    Final result                 Please view results for these tests on the individual orders.    Green Top (Gel) [[id number]] Collected:  04/20/17 1017    Specimen:  Blood Updated:  04/20/17 1201     Extra Tube Hold for add-ons.      Auto resulted.       S. Pneumo Ag Urine or CSF [[id number]]  (Normal) Collected:  04/20/17 1815    Specimen:  Urine from Urine, Clean Catch Updated:  04/20/17 2026     Strep Pneumo Ag Negative    Legionella Antigen, Urine [[id number]]  (Normal) Collected:  04/20/17 1815    Specimen:  Urine from Urine, Clean Catch Updated:  04/20/17 2035     LEGIONELLA ANTIGEN, URINE Negative    Respiratory Panel, PCR [[id number]]  (Normal) Collected:  04/20/17 1826    Specimen:  Swab from Nares Updated:  04/20/17 2159      ADENOVIRUS, PCR Not Detected     Coronavirus 229E Not Detected     Coronavirus HKU1 Not Detected     Coronavirus NL63 Not Detected     Coronavirus OC43 Not Detected     Human Metapneumovirus Not Detected     Human Rhinovirus/Enterovirus Not Detected     Influenza B PCR Not Detected     Parainfluenza Virus 1 Not Detected     Parainfluenza Virus 2 Not Detected     Parainfluenza Virus 3 Not Detected     Parainfluenza Virus 4 Not Detected     Bordetella pertussis pcr Not Detected     Influenza 2009 H1N1 by PCR Not Detected     Chlamydophila pneumoniae PCR Not Detected     Mycoplasma pneumo by PCR Not Detected     Influenza A PCR Not Detected     Influenza A H3 Not Detected     Influenza A H1 Not Detected     RSV, PCR Not Detected    Blood Culture [[id number]]  (Normal) Collected:  04/20/17 1017    Specimen:  Blood from Arm, Right Updated:  04/20/17 2301     Blood Culture No growth at less than 24 hours    Blood Culture [[id number]]  (Normal) Collected:  04/20/17 1057    Specimen:  Blood from Arm, Right Updated:  04/21/17 0001     Blood Culture No growth at less than 24 hours        Imaging Results (last 24 hours)     Procedure Component Value Units Date/Time    XR Chest 2 View [[id number]] Collected:  04/20/17 1039     Updated:  04/20/17 1044    Narrative:         [facility] [place]    Patient Name: MR. TRISH CORTEZ    ORDERING: DONNELL BENEDICT     ATTENDING: SHAGGY WATTERS     REFERRING: DONNELL BENEDICT    -----------------------    PROCEDURE: Two-view chest    COMPARISON: None.    HISTORY: Simple Sepsis triage protocol    FINDINGS: Frontal and lateral views of the chest are obtained.     Devices: None    Lungs/Pleura: Right midlung zone contains a small focal area of  opacification with air bronchograms which could represent  pneumonia. Lungs otherwise appear clear.    Cardiomediastinal structures: Normal         Impression:       CONCLUSION:    Right midlung zone contains a small focal area of  opacification  with air bronchograms which could represent pneumonia.    Electronically signed by:  Oz Wild MD  4/20/2017 10:42 AM  CDT Workstation: [id number]    XR Forearm 2 View Left [[id number]] Collected:  04/20/17 1042     Updated:  04/20/17 1049    Narrative:       [facility] [place]    Patient Name: TRISH CORTEZ    ORDERING: DONNELL BENEDICT    ATTENDING: SHAGGY WATTERS     REFERRING: DONNELL BENEDICT    -----------------------      PROCEDURE: AP and lateral left forearm    COMPARISON: No comparison    HISTORY: pain    FINDINGS: No acute fracture, subluxation or focal osseous lesion.      Impression:       CONCLUSION:    No radiographic evidence of acute fracture    Electronically signed by:  Oz Wild MD  4/20/2017 10:48 AM  CDT Workstation: [id number]    CT Upper Extremity Left Without Contrast [[id number]] Collected:  04/20/17 1946     Updated:  04/20/17 1951    Narrative:         EXAMINATION:   Computed Tomography  REGION:      Left upper extremity        INDICATION:    Area of focal inflammation, suspected spider bite       HISTORY:  URMILA. IMAGING:     none       TECHNIQUE:      - field of view:      small field-of-view limited to the  vertebrae       - reconstructions:    axial, coronal, sagittal        - contrast:                 intravenous:   none        Please note: Lack of intravenous contrast limits soft tissue  assessment.  This exam was performed according to the departmental  dose-optimization program which includes automated exposure  control, adjustment of the mA and/or kV according to patient size  and/or use of iterative reconstruction technique.           COMMENTS:          CT imaging of the left upper extremity, performed without  intravenous contrast which limits soft tissue assessment,  demonstrates inflammatory infiltration of the subcutaneous space  in the region of the distal humerus/proximal elbow, extending  beyond the olecranon into the proximal forearm along  the  posterior aspect. There is no gross evidence of focal abscess.  There is no gross evidence of osseous involvement.         Impression:       CONCLUSION:        1.  Limited soft tissue assessment of the mid upper extremity due  to the lack of intravenous contrast. No gross evidence of a focal  abscess. Inflammation appears to extend throughout the  subcutaneous space, however muscle compartment involvement cannot  be excluded.    Suggest nonemergent contrast enhanced MRI for soft tissue  assessment.                          Electronically signed by:  REZA Jiménez MD  4/20/2017 7:50  PM CDT Workstation: [id number]            Assessment:  1) Sepsis secondary to upper extremity cellulitis and CAP  2) Cellulitis of upper extremity  3) Community acquired pneumonia      Plan:  - Will place patient on Vancomycin to cover for MRSA. Patient will be started on Rocephin/Azithromycin for pnuemonia. Hydrate with IV Fluids. Follow AM Labs. Ortho consult placed.   - SCDs/TEDs for DVT PPx         Grant Hart MD  04/20/2017  4:30 PM

## 2017-04-21 NOTE — PROGRESS NOTES
"Pharmacokinetics by Pharmacy - Vancomycin    Javed Hair is a 21 y.o. male  [Ht: 72\" (182.9 cm); Wt: 163 lb 9.6 oz (74.2 kg)]    Estimated Creatinine Clearance: 162.1 mL/min (by C-G formula based on Cr of 0.75).   Lab Results   Component Value Date    CREATININE 0.75 04/21/2017    CREATININE 0.80 04/20/2017      Lab Results   Component Value Date    WBC 16.90 (H) 04/21/2017    WBC 22.12 (H) 04/20/2017      Temp Readings from Last 1 Encounters:   04/21/17 98.7 °F (37.1 °C) (Tympanic)      Lab Results   Component Value Date    VANCOTROUGH 8.87 (L) 04/21/2017         Culture Results:  Microbiology Results (last 10 days)       Procedure Component Value - Date/Time      Respiratory Panel, PCR [[id number]]  (Normal) Collected:  04/20/17 1826    Lab Status:  Final result Specimen:  Swab from Nares Updated:  04/20/17 2159     ADENOVIRUS, PCR Not Detected     Coronavirus 229E Not Detected     Coronavirus HKU1 Not Detected     Coronavirus NL63 Not Detected     Coronavirus OC43 Not Detected     Human Metapneumovirus Not Detected     Human Rhinovirus/Enterovirus Not Detected     Influenza B PCR Not Detected     Parainfluenza Virus 1 Not Detected     Parainfluenza Virus 2 Not Detected     Parainfluenza Virus 3 Not Detected     Parainfluenza Virus 4 Not Detected     Bordetella pertussis pcr Not Detected     Influenza 2009 H1N1 by PCR Not Detected     Chlamydophila pneumoniae PCR Not Detected     Mycoplasma pneumo by PCR Not Detected     Influenza A PCR Not Detected     Influenza A H3 Not Detected     Influenza A H1 Not Detected     RSV, PCR Not Detected    S. Pneumo Ag Urine or CSF [[id number]]  (Normal) Collected:  04/20/17 1815    Lab Status:  Final result Specimen:  Urine from Urine, Clean Catch Updated:  04/20/17 2026     Strep Pneumo Ag Negative    Blood Culture [[id number]]  (Abnormal) Collected:  04/20/17 1057    Lab Status:  Preliminary result Specimen:  Blood from Arm, Right Updated:  04/21/17 0614     Blood Culture " Abnormal Stain (A)     Gram Stain Result Aerobic Bottle Gram positive cocci in clusters      three of four; 04/21 0500 alp         Anaerobic Bottle Gram positive cocci in clusters    Blood Culture [[id number]]  (Normal) Collected:  04/20/17 1017    Lab Status:  Preliminary result Specimen:  Blood from Arm, Right Updated:  04/20/17 2301     Blood Culture No growth at less than 24 hours    Blood Culture ID, PCR [[id number]]  (Abnormal) Collected:  04/20/17 1017    Lab Status:  Final result Specimen:  Blood from Arm, Right Updated:  04/21/17 0509     BCID, PCR Staphylococcus aureus, not MRSA. Identification by BCID PCR. (C)    Narrative:         Sensitivity to Follow               Indication for use: cellulitis of left upper extremity     Current Vancomycin Dose:  1000 mg IVPB every 8 hours, day 2 of therapy.      Assessment/Plan:  Reviewed above labs and cultures.   Vancomycin trough level today =8.87. Goal is 10-15. Expecting some drug accumulation. Will continue current dose. Will check another level tomorrow. Pharmacy will continue to monitor renal function and adjust dose accordingly.    Ailyn Isabel [facility]   04/21/17 11:55 AM

## 2017-04-21 NOTE — PLAN OF CARE
Problem: Patient Care Overview (Adult)  Goal: Plan of Care Review  Outcome: Ongoing (interventions implemented as appropriate)    04/21/17 0548   Coping/Psychosocial Response Interventions   Plan Of Care Reviewed With patient   Patient Care Overview   Progress no change   Outcome Evaluation   Outcome Summary/Follow up Plan Pt complains of pain r/t pneumonia; analgesics administered; soaked L forearm in epsom salt per Dr. Villela orders; complained of chest pain late in shift, STAT ekg was unremarkable; vital signs stable; will continue to monitor         04/21/17 0548   Coping/Psychosocial Response Interventions   Plan Of Care Reviewed With patient   Patient Care Overview   Progress no change   Outcome Evaluation   Outcome Summary/Follow up Plan Pt complains of pain r/t pneumonia; analgesics administered; soaked L forearm in epsom salt per Dr. Villela orders; complained of chest pain late in shift, STAT ekg was unremarkable; vital signs stable; will continue to monitor       Goal: Adult Individualization and Mutuality  Outcome: Ongoing (interventions implemented as appropriate)  Goal: Discharge Needs Assessment  Outcome: Ongoing (interventions implemented as appropriate)    Problem: Skin Integrity Impairment, Risk/Actual (Adult)  Goal: Identify Related Risk Factors and Signs and Symptoms  Outcome: Outcome(s) achieved Date Met:  04/21/17  Goal: Skin Integrity/Wound Healing  Outcome: Ongoing (interventions implemented as appropriate)    Problem: Pain, Acute (Adult)  Goal: Identify Related Risk Factors and Signs and Symptoms  Outcome: Ongoing (interventions implemented as appropriate)  Goal: Acceptable Pain Control/Comfort Level  Outcome: Ongoing (interventions implemented as appropriate)

## 2017-04-21 NOTE — NURSING NOTE
Patient left room after unhooking IV himself and getting dressed no one was notified of his leaving.  Patient was gone for over an hour.  Security notified, grounds searched patient not found.  Room searched no IV found assume that IV access still intact when patient left.  Police called at this time, information given when patient was located per phone he was at his grandmothers home and the officer stated that the patient was doing ok.  The officer did not go to where the patient was to determine if the patient access was still intact.

## 2017-04-22 LAB
BACTERIA SPEC AEROBE CULT: ABNORMAL
BACTERIA SPEC AEROBE CULT: ABNORMAL
GRAM STN SPEC: ABNORMAL
ISOLATED FROM: ABNORMAL
ISOLATED FROM: ABNORMAL

## 2018-03-27 ENCOUNTER — HOSPITAL ENCOUNTER (INPATIENT)
Facility: HOSPITAL | Age: 23
LOS: 1 days | Discharge: HOME | End: 2018-03-28
Attending: FAMILY MEDICINE | Admitting: INTERNAL MEDICINE

## 2018-03-27 ENCOUNTER — APPOINTMENT (OUTPATIENT)
Dept: ULTRASOUND IMAGING | Facility: HOSPITAL | Age: 23
End: 2018-03-27

## 2018-03-27 DIAGNOSIS — K81.0 ACUTE CHOLECYSTITIS: Primary | ICD-10-CM

## 2018-03-27 LAB
ALBUMIN SERPL-MCNC: 3.8 G/DL (ref 3.4–4.8)
ALBUMIN/GLOB SERPL: 1.1 G/DL (ref 1.1–1.8)
ALP SERPL-CCNC: 60 U/L (ref 38–126)
ALT SERPL W P-5'-P-CCNC: 32 U/L (ref 21–72)
ANION GAP SERPL CALCULATED.3IONS-SCNC: 11 MMOL/L (ref 5–15)
AST SERPL-CCNC: 24 U/L (ref 17–59)
BASOPHILS # BLD AUTO: 0.04 10*3/MM3 (ref 0–0.2)
BASOPHILS NFR BLD AUTO: 0.4 % (ref 0–2)
BILIRUB SERPL-MCNC: 0.5 MG/DL (ref 0.2–1.3)
BILIRUB UR QL STRIP: ABNORMAL
BUN BLD-MCNC: 14 MG/DL (ref 7–21)
BUN/CREAT SERPL: 18.2 (ref 7–25)
CALCIUM SPEC-SCNC: 8.4 MG/DL (ref 8.4–10.2)
CHLORIDE SERPL-SCNC: 96 MMOL/L (ref 95–110)
CLARITY UR: CLEAR
CO2 SERPL-SCNC: 28 MMOL/L (ref 22–31)
COLOR UR: ABNORMAL
CREAT BLD-MCNC: 0.77 MG/DL (ref 0.7–1.3)
CRP SERPL-MCNC: 16.7 MG/DL (ref 0–1)
DEPRECATED RDW RBC AUTO: 40.5 FL (ref 35.1–43.9)
EOSINOPHIL # BLD AUTO: 0.34 10*3/MM3 (ref 0–0.7)
EOSINOPHIL NFR BLD AUTO: 3.2 % (ref 0–7)
ERYTHROCYTE [DISTWIDTH] IN BLOOD BY AUTOMATED COUNT: 13.2 % (ref 11.5–14.5)
GFR SERPL CREATININE-BSD FRML MDRD: 125 ML/MIN/1.73 (ref 77–179)
GLOBULIN UR ELPH-MCNC: 3.4 GM/DL (ref 2.3–3.5)
GLUCOSE BLD-MCNC: 97 MG/DL (ref 60–100)
GLUCOSE UR STRIP-MCNC: NEGATIVE MG/DL
HCT VFR BLD AUTO: 38.9 % (ref 39–49)
HGB BLD-MCNC: 13.4 G/DL (ref 13.7–17.3)
HGB UR QL STRIP.AUTO: NEGATIVE
HOLD SPECIMEN: NORMAL
HOLD SPECIMEN: NORMAL
IMM GRANULOCYTES # BLD: 0.03 10*3/MM3 (ref 0–0.02)
IMM GRANULOCYTES NFR BLD: 0.3 % (ref 0–0.5)
KETONES UR QL STRIP: NEGATIVE
LEUKOCYTE ESTERASE UR QL STRIP.AUTO: NEGATIVE
LIPASE SERPL-CCNC: 32 U/L (ref 23–300)
LYMPHOCYTES # BLD AUTO: 1.73 10*3/MM3 (ref 0.6–4.2)
LYMPHOCYTES NFR BLD AUTO: 16.4 % (ref 10–50)
MCH RBC QN AUTO: 29.2 PG (ref 26.5–34)
MCHC RBC AUTO-ENTMCNC: 34.4 G/DL (ref 31.5–36.3)
MCV RBC AUTO: 84.7 FL (ref 80–98)
MONOCYTES # BLD AUTO: 1.36 10*3/MM3 (ref 0–0.9)
MONOCYTES NFR BLD AUTO: 12.9 % (ref 0–12)
NEUTROPHILS # BLD AUTO: 7.04 10*3/MM3 (ref 2–8.6)
NEUTROPHILS NFR BLD AUTO: 66.8 % (ref 37–80)
NITRITE UR QL STRIP: NEGATIVE
PH UR STRIP.AUTO: 6.5 [PH] (ref 5–9)
PLATELET # BLD AUTO: 269 10*3/MM3 (ref 150–450)
PMV BLD AUTO: 10.4 FL (ref 8–12)
POTASSIUM BLD-SCNC: 4.3 MMOL/L (ref 3.5–5.1)
PROT SERPL-MCNC: 7.2 G/DL (ref 6.3–8.6)
PROT UR QL STRIP: ABNORMAL
RBC # BLD AUTO: 4.59 10*6/MM3 (ref 4.37–5.74)
SODIUM BLD-SCNC: 135 MMOL/L (ref 137–145)
SP GR UR STRIP: 1.02 (ref 1–1.03)
UROBILINOGEN UR QL STRIP: ABNORMAL
WBC NRBC COR # BLD: 10.54 10*3/MM3 (ref 3.2–9.8)
WHOLE BLOOD HOLD SPECIMEN: NORMAL
WHOLE BLOOD HOLD SPECIMEN: NORMAL

## 2018-03-27 PROCEDURE — 80307 DRUG TEST PRSMV CHEM ANLYZR: CPT | Performed by: ANESTHESIOLOGY

## 2018-03-27 PROCEDURE — 85025 COMPLETE CBC W/AUTO DIFF WBC: CPT | Performed by: FAMILY MEDICINE

## 2018-03-27 PROCEDURE — 83690 ASSAY OF LIPASE: CPT | Performed by: FAMILY MEDICINE

## 2018-03-27 PROCEDURE — 81003 URINALYSIS AUTO W/O SCOPE: CPT | Performed by: FAMILY MEDICINE

## 2018-03-27 PROCEDURE — 25010000002 MORPHINE PER 10 MG: Performed by: FAMILY MEDICINE

## 2018-03-27 PROCEDURE — 25010000002 MORPHINE PER 10 MG

## 2018-03-27 PROCEDURE — 99284 EMERGENCY DEPT VISIT MOD MDM: CPT

## 2018-03-27 PROCEDURE — 25010000002 ONDANSETRON PER 1 MG: Performed by: FAMILY MEDICINE

## 2018-03-27 PROCEDURE — 80053 COMPREHEN METABOLIC PANEL: CPT | Performed by: FAMILY MEDICINE

## 2018-03-27 PROCEDURE — 76705 ECHO EXAM OF ABDOMEN: CPT

## 2018-03-27 PROCEDURE — 86140 C-REACTIVE PROTEIN: CPT | Performed by: FAMILY MEDICINE

## 2018-03-27 PROCEDURE — 85610 PROTHROMBIN TIME: CPT | Performed by: INTERNAL MEDICINE

## 2018-03-27 RX ORDER — MORPHINE SULFATE 8 MG/ML
4 INJECTION INTRAMUSCULAR; INTRAVENOUS; SUBCUTANEOUS ONCE
Status: COMPLETED | OUTPATIENT
Start: 2018-03-27 | End: 2018-03-27

## 2018-03-27 RX ORDER — SODIUM CHLORIDE 0.9 % (FLUSH) 0.9 %
10 SYRINGE (ML) INJECTION AS NEEDED
Status: DISCONTINUED | OUTPATIENT
Start: 2018-03-27 | End: 2018-03-28 | Stop reason: HOSPADM

## 2018-03-27 RX ORDER — ONDANSETRON 2 MG/ML
4 INJECTION INTRAMUSCULAR; INTRAVENOUS ONCE
Status: COMPLETED | OUTPATIENT
Start: 2018-03-27 | End: 2018-03-27

## 2018-03-27 RX ORDER — MORPHINE SULFATE 2 MG/ML
INJECTION, SOLUTION INTRAMUSCULAR; INTRAVENOUS
Status: COMPLETED
Start: 2018-03-27 | End: 2018-03-27

## 2018-03-27 RX ADMIN — SODIUM CHLORIDE 1000 ML: 9 INJECTION, SOLUTION INTRAVENOUS at 21:46

## 2018-03-27 RX ADMIN — MORPHINE SULFATE 4 MG: 8 INJECTION INTRAVENOUS at 21:47

## 2018-03-27 RX ADMIN — ONDANSETRON HYDROCHLORIDE 4 MG: 2 INJECTION, SOLUTION INTRAMUSCULAR; INTRAVENOUS at 21:46

## 2018-03-27 RX ADMIN — MORPHINE SULFATE 4 MG: 2 INJECTION, SOLUTION INTRAMUSCULAR; INTRAVENOUS at 21:47

## 2018-03-28 ENCOUNTER — PREP FOR SURGERY (OUTPATIENT)
Dept: OTHER | Facility: HOSPITAL | Age: 23
End: 2018-03-28

## 2018-03-28 ENCOUNTER — ANESTHESIA EVENT (OUTPATIENT)
Dept: PERIOP | Facility: HOSPITAL | Age: 23
End: 2018-03-28

## 2018-03-28 ENCOUNTER — ANESTHESIA (OUTPATIENT)
Dept: PERIOP | Facility: HOSPITAL | Age: 23
End: 2018-03-28

## 2018-03-28 VITALS
BODY MASS INDEX: 22.35 KG/M2 | HEART RATE: 71 BPM | SYSTOLIC BLOOD PRESSURE: 119 MMHG | RESPIRATION RATE: 18 BRPM | TEMPERATURE: 97.1 F | WEIGHT: 165 LBS | HEIGHT: 72 IN | DIASTOLIC BLOOD PRESSURE: 67 MMHG | OXYGEN SATURATION: 98 %

## 2018-03-28 DIAGNOSIS — K81.9 CHOLECYSTITIS: Primary | ICD-10-CM

## 2018-03-28 LAB
AMPHET+METHAMPHET UR QL: NEGATIVE
AMPHET+METHAMPHET UR QL: NEGATIVE
ANION GAP SERPL CALCULATED.3IONS-SCNC: 9 MMOL/L (ref 5–15)
BARBITURATES UR QL SCN: NEGATIVE
BARBITURATES UR QL SCN: NEGATIVE
BENZODIAZ UR QL SCN: NEGATIVE
BENZODIAZ UR QL SCN: NEGATIVE
BUN BLD-MCNC: 10 MG/DL (ref 7–21)
BUN/CREAT SERPL: 12.5 (ref 7–25)
CALCIUM SPEC-SCNC: 8.5 MG/DL (ref 8.4–10.2)
CANNABINOIDS SERPL QL: POSITIVE
CANNABINOIDS SERPL QL: POSITIVE
CHLORIDE SERPL-SCNC: 103 MMOL/L (ref 95–110)
CO2 SERPL-SCNC: 28 MMOL/L (ref 22–31)
COCAINE UR QL: NEGATIVE
COCAINE UR QL: NEGATIVE
CREAT BLD-MCNC: 0.8 MG/DL (ref 0.7–1.3)
DEPRECATED RDW RBC AUTO: 41.2 FL (ref 35.1–43.9)
ERYTHROCYTE [DISTWIDTH] IN BLOOD BY AUTOMATED COUNT: 13.3 % (ref 11.5–14.5)
GFR SERPL CREATININE-BSD FRML MDRD: 120 ML/MIN/1.73 (ref 60–179)
GLUCOSE BLD-MCNC: 92 MG/DL (ref 60–100)
HCT VFR BLD AUTO: 37.7 % (ref 39–49)
HGB BLD-MCNC: 12.7 G/DL (ref 13.7–17.3)
INR PPP: 1.05 (ref 0.8–1.2)
MCH RBC QN AUTO: 28.8 PG (ref 26.5–34)
MCHC RBC AUTO-ENTMCNC: 33.7 G/DL (ref 31.5–36.3)
MCV RBC AUTO: 85.5 FL (ref 80–98)
METHADONE UR QL SCN: NEGATIVE
METHADONE UR QL SCN: NEGATIVE
OPIATES UR QL: NEGATIVE
OPIATES UR QL: POSITIVE
OXYCODONE UR QL SCN: NEGATIVE
OXYCODONE UR QL SCN: NEGATIVE
PLATELET # BLD AUTO: 249 10*3/MM3 (ref 150–450)
PMV BLD AUTO: 10.3 FL (ref 8–12)
POTASSIUM BLD-SCNC: 4.2 MMOL/L (ref 3.5–5.1)
PROTHROMBIN TIME: 13.5 SECONDS (ref 11.1–15.3)
RBC # BLD AUTO: 4.41 10*6/MM3 (ref 4.37–5.74)
SODIUM BLD-SCNC: 140 MMOL/L (ref 137–145)
WBC NRBC COR # BLD: 7.87 10*3/MM3 (ref 3.2–9.8)

## 2018-03-28 PROCEDURE — 80307 DRUG TEST PRSMV CHEM ANLYZR: CPT | Performed by: INTERNAL MEDICINE

## 2018-03-28 PROCEDURE — 80048 BASIC METABOLIC PNL TOTAL CA: CPT | Performed by: INTERNAL MEDICINE

## 2018-03-28 PROCEDURE — 85027 COMPLETE CBC AUTOMATED: CPT | Performed by: INTERNAL MEDICINE

## 2018-03-28 RX ORDER — SODIUM CHLORIDE 0.9 % (FLUSH) 0.9 %
1-10 SYRINGE (ML) INJECTION AS NEEDED
Status: DISCONTINUED | OUTPATIENT
Start: 2018-03-28 | End: 2018-03-28 | Stop reason: HOSPADM

## 2018-03-28 RX ORDER — ONDANSETRON 2 MG/ML
4 INJECTION INTRAMUSCULAR; INTRAVENOUS EVERY 6 HOURS PRN
Status: DISCONTINUED | OUTPATIENT
Start: 2018-03-28 | End: 2018-03-28 | Stop reason: HOSPADM

## 2018-03-28 RX ORDER — SODIUM CHLORIDE 9 MG/ML
125 INJECTION, SOLUTION INTRAVENOUS CONTINUOUS
Status: DISCONTINUED | OUTPATIENT
Start: 2018-03-28 | End: 2018-03-28 | Stop reason: HOSPADM

## 2018-03-28 RX ORDER — MORPHINE SULFATE 2 MG/ML
2 INJECTION, SOLUTION INTRAMUSCULAR; INTRAVENOUS
Status: DISCONTINUED | OUTPATIENT
Start: 2018-03-28 | End: 2018-03-28 | Stop reason: HOSPADM

## 2018-03-28 RX ORDER — NICOTINE 21 MG/24HR
1 PATCH, TRANSDERMAL 24 HOURS TRANSDERMAL EVERY 24 HOURS
Status: DISCONTINUED | OUTPATIENT
Start: 2018-03-28 | End: 2018-03-28 | Stop reason: HOSPADM

## 2018-03-28 RX ORDER — NALOXONE HCL 0.4 MG/ML
0.4 VIAL (ML) INJECTION
Status: DISCONTINUED | OUTPATIENT
Start: 2018-03-28 | End: 2018-03-28 | Stop reason: HOSPADM

## 2018-03-28 RX ADMIN — SODIUM CHLORIDE 125 ML/HR: 9 INJECTION, SOLUTION INTRAVENOUS at 09:06

## 2018-03-28 RX ADMIN — SODIUM CHLORIDE 125 ML/HR: 9 INJECTION, SOLUTION INTRAVENOUS at 01:35

## 2018-03-28 RX ADMIN — NICOTINE 1 PATCH: 21 PATCH TRANSDERMAL at 01:34

## 2018-03-28 NOTE — ED PROVIDER NOTES
Subjective     History provided by:  Patient  Abdominal Pain   Pain location:  RUQ  Pain quality: aching, cramping and stabbing    Pain radiates to:  Does not radiate  Pain severity:  Moderate  Onset quality:  Sudden  Duration:  1 day  Timing:  Constant  Progression:  Worsening  Chronicity:  New  Relieved by:  Nothing  Associated symptoms: nausea and vomiting    Associated symptoms: no chest pain, no dysuria and no fever        Review of Systems   Constitutional: Negative.  Negative for fever.   HENT: Negative.    Respiratory: Negative.    Cardiovascular: Negative.  Negative for chest pain.   Gastrointestinal: Positive for abdominal pain, nausea and vomiting.   Endocrine: Negative.    Genitourinary: Negative.  Negative for dysuria.   Skin: Negative.    Neurological: Negative.    Psychiatric/Behavioral: Negative.    All other systems reviewed and are negative.      Past Medical History:   Diagnosis Date   • ADHD (attention deficit hyperactivity disorder)     as a child       Allergies   Allergen Reactions   • Penicillins Rash       Past Surgical History:   Procedure Laterality Date   • TESTICLE TORSION REPAIR         Family History   Problem Relation Age of Onset   • Hypertension Mother    • Diabetes Paternal Grandmother        Social History     Social History   • Marital status: Single     Social History Main Topics   • Smoking status: Current Every Day Smoker     Packs/day: 1.00     Types: Cigarettes   • Smokeless tobacco: Never Used   • Alcohol use No   • Drug use: No   • Sexual activity: Defer     Other Topics Concern   • Not on file           Objective   Physical Exam   Constitutional: He is oriented to person, place, and time. He appears well-developed and well-nourished. No distress.   HENT:   Head: Normocephalic and atraumatic.   Nose: Nose normal.   Eyes: Conjunctivae are normal.   Neck: Normal range of motion. Neck supple. No JVD present. No tracheal deviation present.   Cardiovascular: Normal rate,  regular rhythm and normal heart sounds.    No murmur heard.  Pulmonary/Chest: Effort normal and breath sounds normal. No respiratory distress. He has no wheezes.   Abdominal: Soft. Bowel sounds are normal. There is tenderness (RUQ).   Musculoskeletal: Normal range of motion. He exhibits no edema or deformity.   Neurological: He is alert and oriented to person, place, and time. No cranial nerve deficit.   Skin: Skin is warm and dry. No rash noted. He is not diaphoretic. No erythema. No pallor.   Psychiatric: He has a normal mood and affect. His behavior is normal. Thought content normal.   Nursing note and vitals reviewed.      Procedures         ED Course  ED Course   Comment By Time   US rad reviewed:  1. Cholelithiasis with markedly thickened gallbladder wall and  small amount of pericholecystic fluid, the appearance is most  consistent with acute cholecystitis.  2. Mildly dilated common duct, recommend at least correlation  with intraoperative cholangiogram and/or consider follow-up MRCP. CHAVA Layne 03/27 2331   Consult general surgeon Dr. Griggs, admitted to hospitalist will perform surgery CHVAA Layne 03/27 2344   Consulted hospitalist Dr. Sarmiento Will accept patient for admission. CHAVA Layne 03/27 2344                  MDM  Number of Diagnoses or Management Options  Acute cholecystitis: new and requires workup     Amount and/or Complexity of Data Reviewed  Clinical lab tests: reviewed  Tests in the radiology section of CPT®: reviewed  Discuss the patient with other providers: yes    Risk of Complications, Morbidity, and/or Mortality  Presenting problems: moderate  Diagnostic procedures: moderate  Management options: moderate    Patient Progress  Patient progress: stable      Final diagnoses:   Acute cholecystitis            CHAVA Layne  03/28/18 0003

## 2018-03-28 NOTE — H&P
[facility] Medicine Admission      Date of Admission: 3/27/2018      Primary Care Physician: No Known Provider      Chief Complaint: abdominal pain     HPI:This is a 22 y old male with  hxof ADHD came to our Ed reporting a hx of right upper quadrant pain for the last 4 days ,he also had some nausea and vomiting for the last 3 days 03/28/18 was the first day without vomiting   He had some chills but no objective fever   His CT of the abdomen showed acute cholecystitis     Past Medical History:  has a past medical history of ADHD (attention deficit hyperactivity disorder).    Past Surgical History:  has a past surgical history that includes Testicle Torsion Repair.    Family History: family history includes Diabetes in his paternal grandmother; Hypertension in his mother.    Social History:  reports that he has been smoking Cigarettes.  He has been smoking about 1.00 pack per day. He has never used smokeless tobacco. He reports that he does not drink alcohol or use drugs.    Allergies:   Allergies   Allergen Reactions   • Penicillins Rash       Medications:   Prescriptions Prior to Admission   Medication Sig Dispense Refill Last Dose   • clindamycin (CLEOCIN) 300 MG capsule Take 1 capsule by mouth 4 (Four) Times a Day. 40 capsule 0 Unknown at Unknown time       Review of Systems:  Review of Systems   Constitutional: Negative for activity change, appetite change, chills, diaphoresis, fatigue, fever and unexpected weight change.   HENT: Negative.  Negative for congestion, dental problem, drooling, ear discharge, ear pain, facial swelling, hearing loss, mouth sores, nosebleeds, postnasal drip, rhinorrhea, sinus pressure, sneezing, tinnitus, trouble swallowing and voice change.    Eyes: Negative for photophobia and discharge.   Respiratory: Negative for apnea, cough, choking, shortness of breath, wheezing and stridor.    Cardiovascular: Negative for chest pain, palpitations and  leg swelling.   Gastrointestinal: Positive for abdominal pain, nausea and vomiting. Negative for anal bleeding, blood in stool, constipation, diarrhea and rectal pain.   Endocrine: Negative for cold intolerance, heat intolerance, polydipsia, polyphagia and polyuria.   Genitourinary: Negative for dysuria, enuresis, frequency, hematuria and urgency.   Musculoskeletal: Negative for arthralgias, back pain, joint swelling, myalgias, neck pain and neck stiffness.   Skin: Negative for color change, pallor, rash and wound.   Allergic/Immunologic: Negative for food allergies and immunocompromised state.   Neurological: Negative for dizziness, tremors, seizures, syncope, facial asymmetry, speech difficulty, weakness, light-headedness, numbness and headaches.   Hematological: Negative for adenopathy.   Psychiatric/Behavioral: Negative for agitation, behavioral problems, confusion, hallucinations, sleep disturbance and suicidal ideas. The patient is not nervous/anxious.       Otherwise complete ROS is negative except as mentioned above.    Physical Exam:   Temp:  [97.5 °F (36.4 °C)-98 °F (36.7 °C)] 97.5 °F (36.4 °C)  Heart Rate:  [] 92  Resp:  [18-20] 18  BP: ()/(55-66) 123/66  Physical Exam   Constitutional: He is oriented to person, place, and time. He appears well-developed and well-nourished.   HENT:   Head: Normocephalic and atraumatic.   Eyes: EOM are normal. Pupils are equal, round, and reactive to light.   Neck: Normal range of motion. Neck supple.   Cardiovascular: Normal rate, regular rhythm and normal heart sounds.  Exam reveals no gallop and no friction rub.    No murmur heard.  Pulmonary/Chest: Effort normal and breath sounds normal. No respiratory distress. He has no wheezes. He has no rales. He exhibits no tenderness.   Abdominal: Soft. Bowel sounds are normal. There is tenderness.   Musculoskeletal: Normal range of motion.   Neurological: He is alert and oriented to person, place, and time.   Skin:  Skin is warm and dry.   Psychiatric: He has a normal mood and affect. His behavior is normal. Judgment and thought content normal.         Results Reviewed:  I have personally reviewed current lab, radiology, and data and agree with results.  Lab Results (last 24 hours)     Procedure Component Value Units Date/Time    [place] Draw [[id number]] Collected:  03/27/18 2148    Specimen:  Blood Updated:  03/27/18 2305    Narrative:       The following orders were created for panel order [place] Draw.  Procedure                               Abnormality         Status                     ---------                               -----------         ------                     Light Blue Top[[id number]]                                    Final result               Green Top (Gel)[[id number]]                                   Final result               Lavender Top[[id number]]                                      Final result               Gold Top - SST[[id number]]                                    Final result                 Please view results for these tests on the individual orders.    Light Blue Top [[id number]] Collected:  03/27/18 2148    Specimen:  Blood Updated:  03/27/18 2305     Extra Tube hold for add-on     Comment: Auto resulted       Lavender Top [[id number]] Collected:  03/27/18 2148    Specimen:  Blood Updated:  03/27/18 2305     Extra Tube hold for add-on     Comment: Auto resulted       Gold Top - SST [[id number]] Collected:  03/27/18 2148    Specimen:  Blood Updated:  03/27/18 2305     Extra Tube Hold for add-ons.     Comment: Auto resulted.       Green Top (Gel) [[id number]] Collected:  03/27/18 2148    Specimen:  Blood Updated:  03/27/18 2304     Extra Tube Hold for add-ons.     Comment: Auto resulted.       C-reactive Protein [[id number]]  (Abnormal) Collected:  03/27/18 2148    Specimen:  Blood Updated:  03/27/18 2231     C-Reactive Protein 16.70 (H) mg/dL     Comprehensive Metabolic Panel [[id number]]  (Abnormal)  Collected:  03/27/18 2148    Specimen:  Blood Updated:  03/27/18 2212     Glucose 97 mg/dL      BUN 14 mg/dL      Creatinine 0.77 mg/dL      Sodium 135 (L) mmol/L      Potassium 4.3 mmol/L      Chloride 96 mmol/L      CO2 28.0 mmol/L      Calcium 8.4 mg/dL      Total Protein 7.2 g/dL      Albumin 3.80 g/dL      ALT (SGPT) 32 U/L      AST (SGOT) 24 U/L      Alkaline Phosphatase 60 U/L      Total Bilirubin 0.5 mg/dL      eGFR Non African Amer 125 mL/min/1.73      Globulin 3.4 gm/dL      A/G Ratio 1.1 g/dL      BUN/Creatinine Ratio 18.2     Anion Gap 11.0 mmol/L     Lipase [[id number]]  (Normal) Collected:  03/27/18 2148    Specimen:  Blood Updated:  03/27/18 2212     Lipase 32 U/L     CBC & Differential [[id number]] Collected:  03/27/18 2148    Specimen:  Blood Updated:  03/27/18 2155    Narrative:       The following orders were created for panel order CBC & Differential.  Procedure                               Abnormality         Status                     ---------                               -----------         ------                     CBC Auto Differential[[id number]]         Abnormal            Final result                 Please view results for these tests on the individual orders.    CBC Auto Differential [[id number]]  (Abnormal) Collected:  03/27/18 2148    Specimen:  Blood Updated:  03/27/18 2155     WBC 10.54 (H) 10*3/mm3      RBC 4.59 10*6/mm3      Hemoglobin 13.4 (L) g/dL      Hematocrit 38.9 (L) %      MCV 84.7 fL      MCH 29.2 pg      MCHC 34.4 g/dL      RDW 13.2 %      RDW-SD 40.5 fl      MPV 10.4 fL      Platelets 269 10*3/mm3      Neutrophil % 66.8 %      Lymphocyte % 16.4 %      Monocyte % 12.9 (H) %      Eosinophil % 3.2 %      Basophil % 0.4 %      Immature Grans % 0.3 %      Neutrophils, Absolute 7.04 10*3/mm3      Lymphocytes, Absolute 1.73 10*3/mm3      Monocytes, Absolute 1.36 (H) 10*3/mm3      Eosinophils, Absolute 0.34 10*3/mm3      Basophils, Absolute 0.04 10*3/mm3      Immature Grans,  Absolute 0.03 (H) 10*3/mm3     Urinalysis With / Culture If Indicated - Urine, Clean Catch [[id number]]  (Abnormal) Collected:  03/27/18 2141    Specimen:  Urine from Urine, Clean Catch Updated:  03/27/18 2154     Color, UA Orange (A)     Appearance, UA Clear     pH, UA 6.5     Specific Gravity, UA 1.020     Glucose, UA Negative     Ketones, UA Negative     Bilirubin, UA Small (1+) (A)     Blood, UA Negative     Protein, UA Trace (A)     Leuk Esterase, UA Negative     Nitrite, UA Negative     Urobilinogen, UA 4.0 E.U./dL (A)    Narrative:       Urine microscopic not indicated.        Imaging Results (last 24 hours)     Procedure Component Value Units Date/Time    US Gallbladder [[id number]] Collected:  03/27/18 2150     Updated:  03/27/18 2256    Narrative:       Ultrasound gallbladder on 3/27/2018    CLINICAL INDICATION: Right upper quadrant pain    COMPARISON: CT from 6/10/2011    FINDINGS: Multiple sonographic images are obtained throughout the  right upper quadrant, both transverse and sagittal images are  obtained. Pancreas is obscured by overlying bowel gas. Visualized  liver is homogeneous without focal lesion or evidence of  intrahepatic biliary ductal dilatation. There is significant  gallbladder wall thickening. There is a large 1.9 cm echogenic  focus with posterior shadowing in the gallbladder neck consistent  with a large gallstone. Small amount of sludge is noted in the  gallbladder. There is a small amount of pericholecystic fluid and  the appearance is most consistent with acute cholecystitis. The  common duct measures 7-8 mm which is mildly dilated. Recommend  correlation with at least intraoperative cholangiogram or  consider follow-up MRCP. Right kidney shows no hydronephrosis.      Impression:       1. Cholelithiasis with markedly thickened gallbladder wall and  small amount of pericholecystic fluid, the appearance is most  consistent with acute cholecystitis.  2. Mildly dilated common duct,  recommend at least correlation  with intraoperative cholangiogram and/or consider follow-up MRCP.    Electronically signed by:  Dustin Nuñez  3/27/2018 10:55 PM  CDT Workstation: [id number]            Assessment:    Hospital Problem List     Acute cholecystitis                Plan:  Admit  To med surgical floor   Iv fluids  Iv pain medicine   Iv zofran   NPO after midnight  Surgical consult (called)  No anticoagulation as he is going for surgery     I discussed the patients findings and my recommendations with: patient     Dominic Sarmiento MD  03/28/18  12:20 AM

## 2018-03-28 NOTE — PROGRESS NOTES
[facility] Medicine Services  INPATIENT PROGRESS NOTE    Length of Stay: 1  Date of Admission: 3/27/2018  Primary Care Physician: No Known Provider    Subjective   Chief Complaint: Abdominal pain  HPI:  Abdominal pain patient was found to have acute cholecystitis for lab cholecystectomy today    Review of Systems   Constitutional: Negative for chills and diaphoresis.   HENT: Negative for congestion, ear discharge, ear pain, mouth sores, rhinorrhea, sinus pain and sore throat.    Eyes: Negative for pain and discharge.   Respiratory: Negative for cough, chest tightness, shortness of breath and wheezing.    Cardiovascular: Negative for chest pain and leg swelling.   Gastrointestinal: Positive for abdominal pain (right upper quadrant abdominal pain). Negative for rectal pain.   Endocrine: Positive for heat intolerance. Negative for cold intolerance.   Genitourinary: Negative for difficulty urinating and flank pain.   Musculoskeletal: Negative for arthralgias, back pain and neck pain.   Neurological: Negative for dizziness, light-headedness and headaches.   Psychiatric/Behavioral: Negative for agitation, behavioral problems and confusion.        All pertinent negatives and positives are as above. All other systems have been reviewed and are negative unless otherwise stated.     Objective    Temp:  [96.8 °F (36 °C)-98.2 °F (36.8 °C)] 98.2 °F (36.8 °C)  Heart Rate:  [] 78  Resp:  [18-20] 18  BP: ()/(55-66) 107/62    Physical Exam   Constitutional: He is oriented to person, place, and time. He appears well-developed and well-nourished.   HENT:   Head: Normocephalic and atraumatic.   Eyes: Conjunctivae and EOM are normal. Pupils are equal, round, and reactive to light.   Neck: Normal range of motion. Neck supple.   Cardiovascular: Normal rate, regular rhythm, normal heart sounds and intact distal pulses.    Pulmonary/Chest: Effort normal and breath sounds normal.    Abdominal: Soft. Bowel sounds are normal. There is tenderness (tenderness right upper quadrant).   Musculoskeletal: Normal range of motion.   Neurological: He is alert and oriented to person, place, and time.   Skin: Skin is warm and dry.   Psychiatric: He has a normal mood and affect. His behavior is normal.           Results Review:  I have reviewed the labs, radiology results, and diagnostic studies.    Laboratory Data:     Results from last 7 days  Lab Units 03/28/18  0531 03/27/18  2148   SODIUM mmol/L 140 135*   POTASSIUM mmol/L 4.2 4.3   CHLORIDE mmol/L 103 96   CO2 mmol/L 28.0 28.0   BUN mg/dL 10 14   CREATININE mg/dL 0.80 0.77   GLUCOSE mg/dL 92 97   CALCIUM mg/dL 8.5 8.4   BILIRUBIN mg/dL  --  0.5   ALK PHOS U/L  --  60   ALT (SGPT) U/L  --  32   AST (SGOT) U/L  --  24   ANION GAP mmol/L 9.0 11.0     Estimated Creatinine Clearance: 151.9 mL/min (by C-G formula based on SCr of 0.8 mg/dL).            Results from last 7 days  Lab Units 03/28/18  0531 03/27/18  2148   WBC 10*3/mm3 7.87 10.54*   HEMOGLOBIN g/dL 12.7* 13.4*   HEMATOCRIT % 37.7* 38.9*   PLATELETS 10*3/mm3 249 269       Results from last 7 days  Lab Units 03/27/18  2148   INR  1.05       Culture Data:   No results found for: BLOODCX  No results found for: URINECX  No results found for: RESPCX  No results found for: WOUNDCX  No results found for: STOOLCX  No components found for: BODYFLD    Radiology Data:   Imaging Results (last 24 hours)     Procedure Component Value Units Date/Time    US Gallbladder [[id number]] Collected:  03/27/18 2150     Updated:  03/27/18 2256    Narrative:       Ultrasound gallbladder on 3/27/2018    CLINICAL INDICATION: Right upper quadrant pain    COMPARISON: CT from 6/10/2011    FINDINGS: Multiple sonographic images are obtained throughout the  right upper quadrant, both transverse and sagittal images are  obtained. Pancreas is obscured by overlying bowel gas. Visualized  liver is homogeneous without focal lesion or  evidence of  intrahepatic biliary ductal dilatation. There is significant  gallbladder wall thickening. There is a large 1.9 cm echogenic  focus with posterior shadowing in the gallbladder neck consistent  with a large gallstone. Small amount of sludge is noted in the  gallbladder. There is a small amount of pericholecystic fluid and  the appearance is most consistent with acute cholecystitis. The  common duct measures 7-8 mm which is mildly dilated. Recommend  correlation with at least intraoperative cholangiogram or  consider follow-up MRCP. Right kidney shows no hydronephrosis.      Impression:       1. Cholelithiasis with markedly thickened gallbladder wall and  small amount of pericholecystic fluid, the appearance is most  consistent with acute cholecystitis.  2. Mildly dilated common duct, recommend at least correlation  with intraoperative cholangiogram and/or consider follow-up MRCP.    Electronically signed by:  Dustin Nuñez  3/27/2018 10:55 PM  CDT Workstation: [id number]          I have reviewed the patient current medications.     Assessment/Plan     Hospital Problem List     * (Principal)Cholecystitis    Overview Signed 3/28/2018  8:45 AM by Ras Griggs MD     Added automatically from request for surgery [id number]         Acute cholecystitis          Plan:  1.  Acute cholecystitis  Lap.  cholecystectomy today  Continue pain  Medication when necessary  Follow-up with surgery appreciated              Discharge Planning: I expect patient to be discharged to home in 1- 2 days.    Prasad Macedo MD  03/28/18  11:20 AM

## 2018-03-28 NOTE — CONSULTS
Referring Provider: Dr Sarmiento hospitalist        Patient Care Team:  No Known Provider as PCP - General      Subjective .     History of present illness:    22-year-old male admitted to the emergency room last evening for a 4 to five-day history of epigastric and right upper quadrant pain with associated nausea and vomiting.  Patient thought he had food poisoning thought was going to resolve but it didn't so he came and presented.  No history of pancreatitis no history of being jaundiced.  Denies any history of this prior to 5 days ago.  No prior abdominal surgery other than he had his right testicle removed for torsion when he was 11 years of age.  Workup in the emergency room demonstrates a white count of 10 with normal LFTs and ultrasound gallbladder which demonstrates a thickened gallbladder wall with gallstones and a 7 mm common bile duct.      Review of Systems   Constitutional: Negative.    HENT: Negative for hearing loss, nosebleeds and trouble swallowing.    Respiratory: Negative for apnea, chest tightness and shortness of breath.    Cardiovascular: Negative for chest pain and palpitations.   Gastrointestinal: Negative for abdominal distention, abdominal pain, blood in stool, constipation, diarrhea, nausea and vomiting.   Genitourinary: Negative for difficulty urinating, dysuria, frequency and urgency.   Musculoskeletal: Negative for back pain, joint swelling and neck pain.   Skin: Negative for rash.   Neurological: Negative for dizziness, seizures, weakness, light-headedness, numbness and headaches.   Hematological: Negative for adenopathy.   Psychiatric/Behavioral: Negative for agitation. The patient is not nervous/anxious.          History  Past Medical History:   Diagnosis Date   • ADHD (attention deficit hyperactivity disorder)     as a child   , Past Surgical History:   Procedure Laterality Date   • TESTICLE TORSION REPAIR     , Family History   Problem Relation Age of Onset   • Hypertension  Mother    • Diabetes Paternal Grandmother    , Social History   Substance Use Topics   • Smoking status: Current Every Day Smoker     Packs/day: 1.00     Types: Cigarettes   • Smokeless tobacco: Former User     Types: Chew   • Alcohol use No   , Home Medications:  Prior to Admission medications    Medication Sig Start Date End Date Taking? Authorizing Provider   clindamycin (CLEOCIN) 300 MG capsule Take 1 capsule by mouth 4 (Four) Times a Day. 4/17/17 3/28/18  Kandis Johnson, APRN   , Scheduled Meds:    nicotine 1 patch Transdermal Q24H   , Continuous Infusions:    sodium chloride 125 mL/hr Last Rate: 125 mL/hr (03/28/18 0135)   , PRN Meds:  •  Morphine **AND** naloxone  •  ondansetron  •  sodium chloride  •  Insert peripheral IV **AND** sodium chloride and Allergies:  Allergies   Allergen Reactions   • Lemon Extract [Flavoring Agent] Anaphylaxis   • Penicillins Rash       Objective     Vital Signs   Temp:  [96.8 °F (36 °C)-98 °F (36.7 °C)] 96.8 °F (36 °C)  Heart Rate:  [] 89  Resp:  [18-20] 18  BP: ()/(55-66) 134/57    Physical Exam:     General Appearance:    Alert, cooperative, in no acute distress   Head:    Normocephalic, without obvious abnormality, atraumatic   Eyes:            Lids and lashes normal, conjunctivae and sclerae normal, no   icterus, no pallor, corneas clear   Ears:    Ears appear intact with no abnormalities noted   Throat:   No oral lesions, no thrush, oral mucosa moist   Neck:   No adenopathy, supple, trachea midline, no thyromegaly,   no JVD   Lungs:     Clear to auscultation,respirations regular, even and                  unlabored    Heart:    Regular rhythm and normal rate, normal S1 and S2, no            murmur, no gallop, no rub, no click   Chest Wall:    No abnormalities observed   Abdomen:     Normal bowel sounds, no masses, no organomegaly, soft        And mildly tender in right upper quadrant and upper epigastric without peritoneal signs    Extremities:   Moves  all extremities well, no edema, no cyanosis, no             redness   Skin:   No bleeding, bruising or rash   Lymph nodes:   No palpable adenopathy   Neurologic:  Grossly intact, sensation intact        Assessment/Plan cholecystitis and cholelithiasis.  I would recommend patient undergo laparoscopic cholecystectomy and interoperative cholangiogram.  I fully explained the procedure alternatives risk and benefits to the patient.  He clearly understands he isn't increased risk for an open procedure due to the way his gallbladder appears on ultrasound.  He clearly understands and wishes to proceed    Patient agrees to undergo laparoscopic cholecystectomy and interoperative cholangiogram.  I have fully discussed the procedure risks, benefits and alternatives with the patient.  They understand the risk include but are not limited to infection, bleeding, injury to intra-abdominal organs and specifically to the common bile duct.  All questions have been answered and the patient wishes to proceed with laparoscopic cholecystectomy and intraoperative cholangiogram.    Active Problems:    Acute cholecystitis        I discussed the patients findings and my recommendations with patient and nursing staff        This document has been electronically signed by Ras Griggs MD on March 28, 2018 8:28 AM     Ras Griggs MD  03/28/18  8:28 AM

## 2018-03-28 NOTE — PLAN OF CARE
Problem: Patient Care Overview  Goal: Plan of Care Review  Outcome: Ongoing (interventions implemented as appropriate)   03/28/18 1409   Coping/Psychosocial   Plan of Care Reviewed With patient   Plan of Care Review   Progress no change   OTHER   Outcome Summary Pain controlled. Vitals stable. Surgery this afternoon.      Goal: Individualization and Mutuality  Outcome: Ongoing (interventions implemented as appropriate)    Goal: Discharge Needs Assessment  Outcome: Ongoing (interventions implemented as appropriate)    Goal: Interprofessional Rounds/Family Conf  Outcome: Ongoing (interventions implemented as appropriate)      Problem: Pain, Acute (Adult)  Goal: Identify Related Risk Factors and Signs and Symptoms  Outcome: Ongoing (interventions implemented as appropriate)    Goal: Acceptable Pain Control/Comfort Level  Outcome: Ongoing (interventions implemented as appropriate)

## 2018-03-28 NOTE — ANESTHESIA PREPROCEDURE EVALUATION
Anesthesia Evaluation     no history of anesthetic complications:  NPO Solid Status: > 8 hours  NPO Liquid Status: > 8 hours           Airway   Mallampati: I  TM distance: >3 FB  Neck ROM: full  No difficulty expected  Dental - normal exam     Pulmonary - normal exam    breath sounds clear to auscultation  (+) a smoker (1 ppd) Current Abstained day of surgery,   Cardiovascular - normal exam  Exercise tolerance: good (4-7 METS)    Rhythm: regular  Rate: normal    (-) angina, murmur      Neuro/Psych    ROS Comment: ADHD as a child  GI/Hepatic/Renal/Endo    (+)  GERD well controlled,      ROS Comment: Pain 4/10  Slight nausea without vomiting    Musculoskeletal     (+) back pain,   Abdominal    Substance History   (+) alcohol use (occ), drug use (marijuana)     OB/GYN          Other - negative ROS                       Anesthesia Plan    ASA 2     general     intravenous induction   Anesthetic plan and risks discussed with patient.

## 2018-03-28 NOTE — PAYOR COMM NOTE
"Trish Cortez (22 y.o. Male)     Date of Birth Social Security Number Address Home Phone MRN    1995  [address] [place] [address] [phone] [id number]    [facility] Mar[employer] Status          [employer] Single       Admission Date Admission Type Admitting Provider Attending Provider Department, Room/Bed    3/27/18 Emergency Dominic Sarmiento MD Amsler, Haizia L, MD [email or web address]], 372/1    Discharge Date Discharge Disposition Discharge Destination                       Attending Provider:  Dominic Sarmiento MD    Allergies:  Lemon Extract [Flavoring Agent], Penicillins    Isolation:  None   Infection:  None   Code Status:  FULL    Ht:  182.9 cm (72\")   Wt:  74.8 kg (165 lb)    Admission Cmt:  None   Principal Problem:  Cholecystitis [K81.9] More...                 Active Insurance as of 3/27/2018     Primary Coverage     Payor Plan Insurance Group Employer/Plan Group    [employer] MEDICAID HU[employer]NA [employer] [facility]     Payor Plan Address Payor Plan Phone Number Effective From Effective To    PO  [phone] 4/20/2017     [place] [address]       Subscriber Name Subscriber Birth Date Member ID       TRISH CORTEZ 1995 [id number]                 Emergency Contacts      (Rel.) Home Phone Work Phone Mobile Phone    Faisal Cortez (Father) [phone] -- [phone]        [facility]  P:[phone]  F:[phone]       History & Physical      Dominic Sarmiento MD at 3/28/2018 12:20 AM                [facility] Admission      Date of Admission: 3/27/2018      Primary Care Physician: No Known Provider      Chief Complaint: abdominal pain     HPI:This is a 22 y old male with  hxof ADHD came to our Ed reporting a hx of right upper quadrant pain for the last 4 days ,he also had some nausea and vomiting for the last 3 days 03/28/18 was the first day without vomiting   He had " some chills but no objective fever   His CT of the abdomen showed acute cholecystitis     Past Medical History:  has a past medical history of ADHD (attention deficit hyperactivity disorder).    Past Surgical History:  has a past surgical history that includes Testicle Torsion Repair.    Family History: family history includes Diabetes in his paternal grandmother; Hypertension in his mother.    Social History:  reports that he has been smoking Cigarettes.  He has been smoking about 1.00 pack per day. He has never used smokeless tobacco. He reports that he does not drink alcohol or use drugs.    Allergies:   Allergies   Allergen Reactions   • Penicillins Rash       Medications:   Prescriptions Prior to Admission   Medication Sig Dispense Refill Last Dose   • clindamycin (CLEOCIN) 300 MG capsule Take 1 capsule by mouth 4 (Four) Times a Day. 40 capsule 0 Unknown at Unknown time       Review of Systems:  Review of Systems   Constitutional: Negative for activity change, appetite change, chills, diaphoresis, fatigue, fever and unexpected weight change.   HENT: Negative.  Negative for congestion, dental problem, drooling, ear discharge, ear pain, facial swelling, hearing loss, mouth sores, nosebleeds, postnasal drip, rhinorrhea, sinus pressure, sneezing, tinnitus, trouble swallowing and voice change.    Eyes: Negative for photophobia and discharge.   Respiratory: Negative for apnea, cough, choking, shortness of breath, wheezing and stridor.    Cardiovascular: Negative for chest pain, palpitations and leg swelling.   Gastrointestinal: Positive for abdominal pain, nausea and vomiting. Negative for anal bleeding, blood in stool, constipation, diarrhea and rectal pain.   Endocrine: Negative for cold intolerance, heat intolerance, polydipsia, polyphagia and polyuria.   Genitourinary: Negative for dysuria, enuresis, frequency, hematuria and urgency.   Musculoskeletal: Negative for arthralgias, back pain, joint swelling,  myalgias, neck pain and neck stiffness.   Skin: Negative for color change, pallor, rash and wound.   Allergic/Immunologic: Negative for food allergies and immunocompromised state.   Neurological: Negative for dizziness, tremors, seizures, syncope, facial asymmetry, speech difficulty, weakness, light-headedness, numbness and headaches.   Hematological: Negative for adenopathy.   Psychiatric/Behavioral: Negative for agitation, behavioral problems, confusion, hallucinations, sleep disturbance and suicidal ideas. The patient is not nervous/anxious.       Otherwise complete ROS is negative except as mentioned above.    Physical Exam:   Temp:  [97.5 °F (36.4 °C)-98 °F (36.7 °C)] 97.5 °F (36.4 °C)  Heart Rate:  [] 92  Resp:  [18-20] 18  BP: ()/(55-66) 123/66  Physical Exam   Constitutional: He is oriented to person, place, and time. He appears well-developed and well-nourished.   HENT:   Head: Normocephalic and atraumatic.   Eyes: EOM are normal. Pupils are equal, round, and reactive to light.   Neck: Normal range of motion. Neck supple.   Cardiovascular: Normal rate, regular rhythm and normal heart sounds.  Exam reveals no gallop and no friction rub.    No murmur heard.  Pulmonary/Chest: Effort normal and breath sounds normal. No respiratory distress. He has no wheezes. He has no rales. He exhibits no tenderness.   Abdominal: Soft. Bowel sounds are normal. There is tenderness.   Musculoskeletal: Normal range of motion.   Neurological: He is alert and oriented to person, place, and time.   Skin: Skin is warm and dry.   Psychiatric: He has a normal mood and affect. His behavior is normal. Judgment and thought content normal.         Results Reviewed:  I have personally reviewed current lab, radiology, and data and agree with results.  Lab Results (last 24 hours)     Procedure Component Value Units Date/Time    [place] Draw [[id number]] Collected:  03/27/18 2148    Specimen:  Blood Updated:  03/27/18 0827     Narrative:       The following orders were created for panel order [place] Draw.  Procedure                               Abnormality         Status                     ---------                               -----------         ------                     Light Blue Top[[id number]]                                    Final result               Green Top (Gel)[[id number]]                                   Final result               Lavender Top[[id number]]                                      Final result               Gold Top - SST[[id number]]                                    Final result                 Please view results for these tests on the individual orders.    Light Blue Top [[id number]] Collected:  03/27/18 2148    Specimen:  Blood Updated:  03/27/18 2305     Extra Tube hold for add-on     Comment: Auto resulted       Lavender Top [[id number]] Collected:  03/27/18 2148    Specimen:  Blood Updated:  03/27/18 2305     Extra Tube hold for add-on     Comment: Auto resulted       Gold Top - SST [[id number]] Collected:  03/27/18 2148    Specimen:  Blood Updated:  03/27/18 2305     Extra Tube Hold for add-ons.     Comment: Auto resulted.       Green Top (Gel) [[id number]] Collected:  03/27/18 2148    Specimen:  Blood Updated:  03/27/18 2304     Extra Tube Hold for add-ons.     Comment: Auto resulted.       C-reactive Protein [[id number]]  (Abnormal) Collected:  03/27/18 2148    Specimen:  Blood Updated:  03/27/18 2231     C-Reactive Protein 16.70 (H) mg/dL     Comprehensive Metabolic Panel [[id number]]  (Abnormal) Collected:  03/27/18 2148    Specimen:  Blood Updated:  03/27/18 2212     Glucose 97 mg/dL      BUN 14 mg/dL      Creatinine 0.77 mg/dL      Sodium 135 (L) mmol/L      Potassium 4.3 mmol/L      Chloride 96 mmol/L      CO2 28.0 mmol/L      Calcium 8.4 mg/dL      Total Protein 7.2 g/dL      Albumin 3.80 g/dL      ALT (SGPT) 32 U/L      AST (SGOT) 24 U/L      Alkaline Phosphatase 60 U/L      Total Bilirubin 0.5 mg/dL       eGFR Non African Amer 125 mL/min/1.73      Globulin 3.4 gm/dL      A/G Ratio 1.1 g/dL      BUN/Creatinine Ratio 18.2     Anion Gap 11.0 mmol/L     Lipase [[id number]]  (Normal) Collected:  03/27/18 2148    Specimen:  Blood Updated:  03/27/18 2212     Lipase 32 U/L     CBC & Differential [[id number]] Collected:  03/27/18 2148    Specimen:  Blood Updated:  03/27/18 2155    Narrative:       The following orders were created for panel order CBC & Differential.  Procedure                               Abnormality         Status                     ---------                               -----------         ------                     CBC Auto Differential[[id number]]         Abnormal            Final result                 Please view results for these tests on the individual orders.    CBC Auto Differential [[id number]]  (Abnormal) Collected:  03/27/18 2148    Specimen:  Blood Updated:  03/27/18 2155     WBC 10.54 (H) 10*3/mm3      RBC 4.59 10*6/mm3      Hemoglobin 13.4 (L) g/dL      Hematocrit 38.9 (L) %      MCV 84.7 fL      MCH 29.2 pg      MCHC 34.4 g/dL      RDW 13.2 %      RDW-SD 40.5 fl      MPV 10.4 fL      Platelets 269 10*3/mm3      Neutrophil % 66.8 %      Lymphocyte % 16.4 %      Monocyte % 12.9 (H) %      Eosinophil % 3.2 %      Basophil % 0.4 %      Immature Grans % 0.3 %      Neutrophils, Absolute 7.04 10*3/mm3      Lymphocytes, Absolute 1.73 10*3/mm3      Monocytes, Absolute 1.36 (H) 10*3/mm3      Eosinophils, Absolute 0.34 10*3/mm3      Basophils, Absolute 0.04 10*3/mm3      Immature Grans, Absolute 0.03 (H) 10*3/mm3     Urinalysis With / Culture If Indicated - Urine, Clean Catch [[id number]]  (Abnormal) Collected:  03/27/18 2141    Specimen:  Urine from Urine, Clean Catch Updated:  03/27/18 2154     Color, UA Orange (A)     Appearance, UA Clear     pH, UA 6.5     Specific Gravity, UA 1.020     Glucose, UA Negative     Ketones, UA Negative     Bilirubin, UA Small (1+) (A)     Blood, UA Negative     Protein, UA  Trace (A)     Leuk Esterase, UA Negative     Nitrite, UA Negative     Urobilinogen, UA 4.0 E.U./dL (A)    Narrative:       Urine microscopic not indicated.        Imaging Results (last 24 hours)     Procedure Component Value Units Date/Time    US Gallbladder [[id number]] Collected:  03/27/18 2150     Updated:  03/27/18 2256    Narrative:       Ultrasound gallbladder on 3/27/2018    CLINICAL INDICATION: Right upper quadrant pain    COMPARISON: CT from 6/10/2011    FINDINGS: Multiple sonographic images are obtained throughout the  right upper quadrant, both transverse and sagittal images are  obtained. Pancreas is obscured by overlying bowel gas. Visualized  liver is homogeneous without focal lesion or evidence of  intrahepatic biliary ductal dilatation. There is significant  gallbladder wall thickening. There is a large 1.9 cm echogenic  focus with posterior shadowing in the gallbladder neck consistent  with a large gallstone. Small amount of sludge is noted in the  gallbladder. There is a small amount of pericholecystic fluid and  the appearance is most consistent with acute cholecystitis. The  common duct measures 7-8 mm which is mildly dilated. Recommend  correlation with at least intraoperative cholangiogram or  consider follow-up MRCP. Right kidney shows no hydronephrosis.      Impression:       1. Cholelithiasis with markedly thickened gallbladder wall and  small amount of pericholecystic fluid, the appearance is most  consistent with acute cholecystitis.  2. Mildly dilated common duct, recommend at least correlation  with intraoperative cholangiogram and/or consider follow-up MRCP.    Electronically signed by:  Dustin Nuñez  3/27/2018 10:55 PM  CDT Workstation: [id number]            Assessment:    Hospital Problem List     Acute cholecystitis                Plan:  Admit  To [facility] surgical floor   Iv fluids  Iv pain medicine   Iv zofran   NPO after midnight  Surgical consult (called)  No anticoagulation as  he is going for surgery     I discussed the patients findings and my recommendations with: patient     Dominic Sarmiento MD  03/28/18  12:20 AM                    Electronically signed by Dominic Sarmiento MD at 3/28/2018 12:25 AM          Emergency Department Notes      CHAVA Layne at 3/28/2018 12:01 AM     Attestation signed by Mikey Dos Santos MD at 3/28/2018 12:36 AM          For this patient encounter, I reviewed the NP or PA documentation, treatment plan, and medical decision making. Mikey Dos Santos MD 3/28/2018 12:36 AM                  Subjective     History provided by:  Patient  Abdominal Pain   Pain location:  RUQ  Pain quality: aching, cramping and stabbing    Pain radiates to:  Does not radiate  Pain severity:  Moderate  Onset quality:  Sudden  Duration:  1 day  Timing:  Constant  Progression:  Worsening  Chronicity:  New  Relieved by:  Nothing  Associated symptoms: nausea and vomiting    Associated symptoms: no chest pain, no dysuria and no fever        Review of Systems   Constitutional: Negative.  Negative for fever.   HENT: Negative.    Respiratory: Negative.    Cardiovascular: Negative.  Negative for chest pain.   Gastrointestinal: Positive for abdominal pain, nausea and vomiting.   Endocrine: Negative.    Genitourinary: Negative.  Negative for dysuria.   Skin: Negative.    Neurological: Negative.    Psychiatric/Behavioral: Negative.    All other systems reviewed and are negative.      Past Medical History:   Diagnosis Date   • ADHD (attention deficit hyperactivity disorder)     as a child       Allergies   Allergen Reactions   • Penicillins Rash       Past Surgical History:   Procedure Laterality Date   • TESTICLE TORSION REPAIR         Family History   Problem Relation Age of Onset   • Hypertension Mother    • Diabetes Paternal Grandmother        Social History     Social History   • Marital status: Single     Social History Main Topics   • Smoking status: Current Every Day Smoker      Packs/day: 1.00     Types: Cigarettes   • Smokeless tobacco: Never Used   • Alcohol use No   • Drug use: No   • Sexual activity: Defer     Other Topics Concern   • Not on file           Objective   Physical Exam   Constitutional: He is oriented to person, place, and time. He appears well-developed and well-nourished. No distress.   HENT:   Head: Normocephalic and atraumatic.   Nose: Nose normal.   Eyes: Conjunctivae are normal.   Neck: Normal range of motion. Neck supple. No JVD present. No tracheal deviation present.   Cardiovascular: Normal rate, regular rhythm and normal heart sounds.    No murmur heard.  Pulmonary/Chest: Effort normal and breath sounds normal. No respiratory distress. He has no wheezes.   Abdominal: Soft. Bowel sounds are normal. There is tenderness (RUQ).   Musculoskeletal: Normal range of motion. He exhibits no edema or deformity.   Neurological: He is alert and oriented to person, place, and time. No cranial nerve deficit.   Skin: Skin is warm and dry. No rash noted. He is not diaphoretic. No erythema. No pallor.   Psychiatric: He has a normal mood and affect. His behavior is normal. Thought content normal.   Nursing note and vitals reviewed.      Procedures        ED Course  ED Course   Comment By Time   US rad reviewed:  1. Cholelithiasis with markedly thickened gallbladder wall and  small amount of pericholecystic fluid, the appearance is most  consistent with acute cholecystitis.  2. Mildly dilated common duct, recommend at least correlation  with intraoperative cholangiogram and/or consider follow-up MRCP. CHAVA Layne 03/27 2335   Consult general surgeon Dr. Griggs, admitted to hospitalist will perform surgery CHAVA Layne 03/27 2346   Consulted hospitalist Dr. Sarmiento Will accept patient for admission. CHAVA Layne 03/27 234                  MDM  Number of Diagnoses or Management Options  Acute cholecystitis: new and requires workup     Amount and/or Complexity of Data  "Reviewed  Clinical lab tests: reviewed  Tests in the radiology section of CPT®:  reviewed  Discuss the patient with other providers: yes    Risk of Complications, Morbidity, and/or Mortality  Presenting problems: moderate  Diagnostic procedures: moderate  Management options: moderate    Patient Progress  Patient progress: stable      Final diagnoses:   Acute cholecystitis            CHAVA Lyane  03/28/18 0003      Electronically signed by Mikey Dos Santos MD at 3/28/2018 12:36 AM       Hospital Medications (all)       Dose Frequency Start End    morphine 2 MG/ML injection  - ADS Override Pull   3/27/2018 3/27/2018    Notes to Pharmacy: CARLIE ANGLIN: cabinet override    morphine injection 2 mg 2 mg Every 3 Hours PRN 3/28/2018 4/7/2018    Sig - Route: Infuse 1 mL into a venous catheter Every 3 (Three) Hours As Needed for Severe Pain . - Intravenous    Linked Group 1:  \"And\" Linked Group Details        morphine injection 4 mg 4 mg Once 3/27/2018 3/27/2018    Sig - Route: Infuse 0.5 mL into a venous catheter 1 (One) Time. - Intravenous    naloxone (NARCAN) injection 0.4 mg 0.4 mg Every 5 Minutes PRN 3/28/2018     Sig - Route: Infuse 1 mL into a venous catheter Every 5 (Five) Minutes As Needed for Respiratory Depression. - Intravenous    Linked Group 1:  \"And\" Linked Group Details        nicotine (NICODERM CQ) 21 MG/24HR patch 1 patch 1 patch Every 24 Hours 3/28/2018     Sig - Route: Place 1 patch on the skin Daily. - Transdermal    Cosign for Ordering: Accepted by Mikey Dos Santos MD on 3/28/2018 12:33 AM    ondansetron (ZOFRAN) injection 4 mg 4 mg Once 3/27/2018 3/27/2018    Sig - Route: Infuse 2 mL into a venous catheter 1 (One) Time. - Intravenous    ondansetron (ZOFRAN) injection 4 mg 4 mg Every 6 Hours PRN 3/28/2018     Sig - Route: Infuse 2 mL into a venous catheter Every 6 (Six) Hours As Needed for Nausea or Vomiting. - Intravenous    sodium chloride 0.9 % bolus 1,000 mL 1,000 mL Once 3/27/2018 " "3/28/2018    Sig - Route: Infuse 1,000 mL into a venous catheter 1 (One) Time. - Intravenous    sodium chloride 0.9 % flush 1-10 mL 1-10 mL As Needed 3/28/2018     Sig - Route: Infuse 1-10 mL into a venous catheter As Needed for Line Care. - Intravenous    sodium chloride 0.9 % flush 10 mL 10 mL As Needed 3/27/2018     Sig - Route: Infuse 10 mL into a venous catheter As Needed for Line Care. - Intravenous    Linked Group 2:  \"And\" Linked Group Details        sodium chloride 0.9 % infusion 125 mL/hr Continuous 3/28/2018     Sig - Route: Infuse 125 mL/hr into a venous catheter Continuous. - Intravenous            Lab Results (last 24 hours)     Procedure Component Value Units Date/Time    Urine Drug Screen - Urine, Clean Catch [[id number]]  (Abnormal) Collected:  03/28/18 1145    Specimen:  Urine from Urine, Clean Catch Updated:  03/28/18 1325     Amphetamine Screen, Urine Negative     Barbiturates Screen, Urine Negative     Benzodiazepine Screen, Urine Negative     Cocaine Screen, Urine Negative     Methadone Screen, Urine Negative     Opiate Screen Positive (A)     Oxycodone Screen, Urine Negative     THC, Screen, Urine Positive (A)    Narrative:       Negative Thresholds For Drugs Screened in Urine:     Amphetamines          500 ng/ml  Barbiturates          200 ng/ml  Benzodiazepines       200 ng/ml  Cocaine               150 ng/ml  Methadone             300 ng/mL  Opiates               300 ng/mL  Oxycodone             100 ng/mL  THC                   20 ng/mL    The normal value for all drugs tested is negative. This report includes final unconfirmed screening results.  A positive result by this assay can be, at your request, sent to the Reference Lab for confirmation by gas chromatography. Unconfirmed results must not be used for non-medical purposes, such as employment or legal testing. Clinical consideration should be applied to any drug of abuse test result, particularly when unconfirmed results are used.    " Urine Drug Screen - Urine, Clean Catch [[id number]]  (Abnormal) Collected:  03/27/18 2127    Specimen:  Urine from Urine, Clean Catch Updated:  03/28/18 1128     Amphetamine Screen, Urine Negative     Barbiturates Screen, Urine Negative     Benzodiazepine Screen, Urine Negative     Cocaine Screen, Urine Negative     Methadone Screen, Urine Negative     Opiate Screen Negative     Oxycodone Screen, Urine Negative     THC, Screen, Urine Positive (A)    Narrative:       Negative Thresholds For Drugs Screened in Urine:     Amphetamines          500 ng/ml  Barbiturates          200 ng/ml  Benzodiazepines       200 ng/ml  Cocaine               150 ng/ml  Methadone             300 ng/mL  Opiates               300 ng/mL  Oxycodone             100 ng/mL  THC                   20 ng/mL    The normal value for all drugs tested is negative. This report includes final unconfirmed screening results.  A positive result by this assay can be, at your request, sent to the Reference Lab for confirmation by gas chromatography. Unconfirmed results must not be used for non-medical purposes, such as employment or legal testing. Clinical consideration should be applied to any drug of abuse test result, particularly when unconfirmed results are used.    Basic Metabolic Panel [[id number]]  (Normal) Collected:  03/28/18 0531    Specimen:  Blood Updated:  03/28/18 0628     Glucose 92 mg/dL      BUN 10 mg/dL      Creatinine 0.80 mg/dL      Sodium 140 mmol/L      Potassium 4.2 mmol/L      Chloride 103 mmol/L      CO2 28.0 mmol/L      Calcium 8.5 mg/dL      eGFR Non African Amer 120 mL/min/1.73      BUN/Creatinine Ratio 12.5     Anion Gap 9.0 mmol/L     CBC (No Diff) [[id number]]  (Abnormal) Collected:  03/28/18 0531    Specimen:  Blood Updated:  03/28/18 0613     WBC 7.87 10*3/mm3      RBC 4.41 10*6/mm3      Hemoglobin 12.7 (L) g/dL      Hematocrit 37.7 (L) %      MCV 85.5 fL      MCH 28.8 pg      MCHC 33.7 g/dL      RDW 13.3 %      RDW-SD 41.2 fl       MPV 10.3 fL      Platelets 249 10*3/mm3     Protime-INR [[id number]]  (Normal) Collected:  03/27/18 2148    Specimen:  Blood Updated:  03/28/18 0041     Protime 13.5 Seconds      INR 1.05    Narrative:       Therapeutic range for most indications is 2.0-3.0 INR,  or 2.5-3.5 for mechanical heart valves.    [place] Draw [[id number]] Collected:  03/27/18 2148    Specimen:  Blood Updated:  03/27/18 2305    Narrative:       The following orders were created for panel order [place] Draw.  Procedure                               Abnormality         Status                     ---------                               -----------         ------                     Light Blue Top[[id number]]                                    Final result               Green Top (Gel)[[id number]]                                   Final result               Lavender Top[[id number]]                                      Final result               Gold Top - SST[[id number]]                                    Final result                 Please view results for these tests on the individual orders.    Light Blue Top [[id number]] Collected:  03/27/18 2148    Specimen:  Blood Updated:  03/27/18 2305     Extra Tube hold for add-on     Comment: Auto resulted       Lavender Top [[id number]] Collected:  03/27/18 2148    Specimen:  Blood Updated:  03/27/18 2305     Extra Tube hold for add-on     Comment: Auto resulted       Gold Top - SST [[id number]] Collected:  03/27/18 2148    Specimen:  Blood Updated:  03/27/18 2305     Extra Tube Hold for add-ons.     Comment: Auto resulted.       Green Top (Gel) [[id number]] Collected:  03/27/18 2148    Specimen:  Blood Updated:  03/27/18 2304     Extra Tube Hold for add-ons.     Comment: Auto resulted.       C-reactive Protein [[id number]]  (Abnormal) Collected:  03/27/18 2148    Specimen:  Blood Updated:  03/27/18 2231     C-Reactive Protein 16.70 (H) mg/dL     Comprehensive Metabolic Panel [[id number]]  (Abnormal) Collected:   03/27/18 2148    Specimen:  Blood Updated:  03/27/18 2212     Glucose 97 mg/dL      BUN 14 mg/dL      Creatinine 0.77 mg/dL      Sodium 135 (L) mmol/L      Potassium 4.3 mmol/L      Chloride 96 mmol/L      CO2 28.0 mmol/L      Calcium 8.4 mg/dL      Total Protein 7.2 g/dL      Albumin 3.80 g/dL      ALT (SGPT) 32 U/L      AST (SGOT) 24 U/L      Alkaline Phosphatase 60 U/L      Total Bilirubin 0.5 mg/dL      eGFR Non African Amer 125 mL/min/1.73      Globulin 3.4 gm/dL      A/G Ratio 1.1 g/dL      BUN/Creatinine Ratio 18.2     Anion Gap 11.0 mmol/L     Lipase [[id number]]  (Normal) Collected:  03/27/18 2148    Specimen:  Blood Updated:  03/27/18 2212     Lipase 32 U/L     CBC & Differential [[id number]] Collected:  03/27/18 2148    Specimen:  Blood Updated:  03/27/18 2155    Narrative:       The following orders were created for panel order CBC & Differential.  Procedure                               Abnormality         Status                     ---------                               -----------         ------                     CBC Auto Differential[[id number]]         Abnormal            Final result                 Please view results for these tests on the individual orders.    CBC Auto Differential [[id number]]  (Abnormal) Collected:  03/27/18 2148    Specimen:  Blood Updated:  03/27/18 2155     WBC 10.54 (H) 10*3/mm3      RBC 4.59 10*6/mm3      Hemoglobin 13.4 (L) g/dL      Hematocrit 38.9 (L) %      MCV 84.7 fL      MCH 29.2 pg      MCHC 34.4 g/dL      RDW 13.2 %      RDW-SD 40.5 fl      MPV 10.4 fL      Platelets 269 10*3/mm3      Neutrophil % 66.8 %      Lymphocyte % 16.4 %      Monocyte % 12.9 (H) %      Eosinophil % 3.2 %      Basophil % 0.4 %      Immature Grans % 0.3 %      Neutrophils, Absolute 7.04 10*3/mm3      Lymphocytes, Absolute 1.73 10*3/mm3      Monocytes, Absolute 1.36 (H) 10*3/mm3      Eosinophils, Absolute 0.34 10*3/mm3      Basophils, Absolute 0.04 10*3/mm3      Immature Grans, Absolute 0.03 (H)  10*3/mm3     Urinalysis With / Culture If Indicated - Urine, Clean Catch [[id number]]  (Abnormal) Collected:  03/27/18 2141    Specimen:  Urine from Urine, Clean Catch Updated:  03/27/18 2154     Color, UA Orange (A)     Appearance, UA Clear     pH, UA 6.5     Specific Gravity, UA 1.020     Glucose, UA Negative     Ketones, UA Negative     Bilirubin, UA Small (1+) (A)     Blood, UA Negative     Protein, UA Trace (A)     Leuk Esterase, UA Negative     Nitrite, UA Negative     Urobilinogen, UA 4.0 E.U./dL (A)    Narrative:       Urine microscopic not indicated.        Imaging Results (last 24 hours)     Procedure Component Value Units Date/Time    US Gallbladder [[id number]] Collected:  03/27/18 2150     Updated:  03/27/18 2256    Narrative:       Ultrasound gallbladder on 3/27/2018    CLINICAL INDICATION: Right upper quadrant pain    COMPARISON: CT from 6/10/2011    FINDINGS: Multiple sonographic images are obtained throughout the  right upper quadrant, both transverse and sagittal images are  obtained. Pancreas is obscured by overlying bowel gas. Visualized  liver is homogeneous without focal lesion or evidence of  intrahepatic biliary ductal dilatation. There is significant  gallbladder wall thickening. There is a large 1.9 cm echogenic  focus with posterior shadowing in the gallbladder neck consistent  with a large gallstone. Small amount of sludge is noted in the  gallbladder. There is a small amount of pericholecystic fluid and  the appearance is most consistent with acute cholecystitis. The  common duct measures 7-8 mm which is mildly dilated. Recommend  correlation with at least intraoperative cholangiogram or  consider follow-up MRCP. Right kidney shows no hydronephrosis.      Impression:       1. Cholelithiasis with markedly thickened gallbladder wall and  small amount of pericholecystic fluid, the appearance is most  consistent with acute cholecystitis.  2. Mildly dilated common duct, recommend at least  correlation  with intraoperative cholangiogram and/or consider follow-up MRCP.    Electronically signed by:  Dustin Nuñez  3/27/2018 10:55 PM  CDT Workstation: [id number]        ECG/EMG Results (last 24 hours)     ** No results found for the last 24 hours. **           Physician Progress Notes (last 24 hours) (Notes from 3/27/2018  1:55 PM through 3/28/2018  1:55 PM)      Prasad Macedo MD at 3/28/2018 11:20 AM              [facility]  INPATIENT PROGRESS NOTE    Length of Stay: 1  Date of Admission: 3/27/2018  Primary Care Physician: No Known Provider    Subjective   Chief Complaint: Abdominal pain  HPI:  Abdominal pain patient was found to have acute cholecystitis for lab cholecystectomy today    Review of Systems   Constitutional: Negative for chills and diaphoresis.   HENT: Negative for congestion, ear discharge, ear pain, mouth sores, rhinorrhea, sinus pain and sore throat.    Eyes: Negative for pain and discharge.   Respiratory: Negative for cough, chest tightness, shortness of breath and wheezing.    Cardiovascular: Negative for chest pain and leg swelling.   Gastrointestinal: Positive for abdominal pain (right upper quadrant abdominal pain). Negative for rectal pain.   Endocrine: Positive for heat intolerance. Negative for cold intolerance.   Genitourinary: Negative for difficulty urinating and flank pain.   Musculoskeletal: Negative for arthralgias, back pain and neck pain.   Neurological: Negative for dizziness, light-headedness and headaches.   Psychiatric/Behavioral: Negative for agitation, behavioral problems and confusion.        All pertinent negatives and positives are as above. All other systems have been reviewed and are negative unless otherwise stated.     Objective    Temp:  [96.8 °F (36 °C)-98.2 °F (36.8 °C)] 98.2 °F (36.8 °C)  Heart Rate:  [] 78  Resp:  [18-20] 18  BP: ()/(55-66) 107/62    Physical Exam    Constitutional: He is oriented to person, place, and time. He appears well-developed and well-nourished.   HENT:   Head: Normocephalic and atraumatic.   Eyes: Conjunctivae and EOM are normal. Pupils are equal, round, and reactive to light.   Neck: Normal range of motion. Neck supple.   Cardiovascular: Normal rate, regular rhythm, normal heart sounds and intact distal pulses.    Pulmonary/Chest: Effort normal and breath sounds normal.   Abdominal: Soft. Bowel sounds are normal. There is tenderness (tenderness right upper quadrant).   Musculoskeletal: Normal range of motion.   Neurological: He is alert and oriented to person, place, and time.   Skin: Skin is warm and dry.   Psychiatric: He has a normal mood and affect. His behavior is normal.           Results Review:  I have reviewed the labs, radiology results, and diagnostic studies.    Laboratory Data:     Results from last 7 days  Lab Units 03/28/18  0531 03/27/18  2148   SODIUM mmol/L 140 135*   POTASSIUM mmol/L 4.2 4.3   CHLORIDE mmol/L 103 96   CO2 mmol/L 28.0 28.0   BUN mg/dL 10 14   CREATININE mg/dL 0.80 0.77   GLUCOSE mg/dL 92 97   CALCIUM mg/dL 8.5 8.4   BILIRUBIN mg/dL  --  0.5   ALK PHOS U/L  --  60   ALT (SGPT) U/L  --  32   AST (SGOT) U/L  --  24   ANION GAP mmol/L 9.0 11.0     Estimated Creatinine Clearance: 151.9 mL/min (by C-G formula based on SCr of 0.8 mg/dL).            Results from last 7 days  Lab Units 03/28/18  0531 03/27/18 2148   WBC 10*3/mm3 7.87 10.54*   HEMOGLOBIN g/dL 12.7* 13.4*   HEMATOCRIT % 37.7* 38.9*   PLATELETS 10*3/mm3 249 269       Results from last 7 days  Lab Units 03/27/18  2148   INR  1.05       Culture Data:   No results found for: BLOODCX  No results found for: URINECX  No results found for: RESPCX  No results found for: WOUNDCX  No results found for: STOOLCX  No components found for: BODYFLD    Radiology Data:   Imaging Results (last 24 hours)     Procedure Component Value Units Date/Time    US Gallbladder [[id number]]  Collected:  03/27/18 2150     Updated:  03/27/18 2256    Narrative:       Ultrasound gallbladder on 3/27/2018    CLINICAL INDICATION: Right upper quadrant pain    COMPARISON: CT from 6/10/2011    FINDINGS: Multiple sonographic images are obtained throughout the  right upper quadrant, both transverse and sagittal images are  obtained. Pancreas is obscured by overlying bowel gas. Visualized  liver is homogeneous without focal lesion or evidence of  intrahepatic biliary ductal dilatation. There is significant  gallbladder wall thickening. There is a large 1.9 cm echogenic  focus with posterior shadowing in the gallbladder neck consistent  with a large gallstone. Small amount of sludge is noted in the  gallbladder. There is a small amount of pericholecystic fluid and  the appearance is most consistent with acute cholecystitis. The  common duct measures 7-8 mm which is mildly dilated. Recommend  correlation with at least intraoperative cholangiogram or  consider follow-up MRCP. Right kidney shows no hydronephrosis.      Impression:       1. Cholelithiasis with markedly thickened gallbladder wall and  small amount of pericholecystic fluid, the appearance is most  consistent with acute cholecystitis.  2. Mildly dilated common duct, recommend at least correlation  with intraoperative cholangiogram and/or consider follow-up MRCP.    Electronically signed by:  Dustin Nuñez  3/27/2018 10:55 PM  CDT Workstation: [id number]          I have reviewed the patient current medications.     Assessment/Plan     Hospital Problem List     * (Principal)Cholecystitis    Overview Signed 3/28/2018  8:45 AM by Ras Griggs MD     Added automatically from request for surgery [id number]         Acute cholecystitis          Plan:  1.  Acute cholecystitis  Lap.  cholecystectomy today  Continue pain  Medication when necessary  Follow-up with surgery appreciated              Discharge Planning: I expect patient to be discharged to home  in 1- 2 days.    Prasad Macedo MD  03/28/18  11:20 AM      Electronically signed by Prasad Macedo MD at 3/28/2018 11:24 AM       Medical Student Notes (last 24 hours) (Notes from 3/27/2018  1:55 PM through 3/28/2018  1:55 PM)     No notes of this type exist for this encounter.           Consult Notes (last 24 hours) (Notes from 3/27/2018  1:55 PM through 3/28/2018  1:55 PM)      Ras Griggs MD at 3/28/2018  8:28 AM      Consult Orders:    1. Inpatient General Surgery Consult [[id number]] ordered by CHAVA Layne at 03/27/18 4613                    Referring Provider: Dr Sarmiento hospitalist        Patient Care Team:  No Known Provider as PCP - General      Subjective .     History of present illness:    22-year-old male admitted to the emergency room last evening for a 4 to five-day history of epigastric and right upper quadrant pain with associated nausea and vomiting.  Patient thought he had food poisoning thought was going to resolve but it didn't so he came and presented.  No history of pancreatitis no history of being jaundiced.  Denies any history of this prior to 5 days ago.  No prior abdominal surgery other than he had his right testicle removed for torsion when he was 11 years of age.  Workup in the emergency room demonstrates a white count of 10 with normal LFTs and ultrasound gallbladder which demonstrates a thickened gallbladder wall with gallstones and a 7 mm common bile duct.      Review of Systems   Constitutional: Negative.    HENT: Negative for hearing loss, nosebleeds and trouble swallowing.    Respiratory: Negative for apnea, chest tightness and shortness of breath.    Cardiovascular: Negative for chest pain and palpitations.   Gastrointestinal: Negative for abdominal distention, abdominal pain, blood in stool, constipation, diarrhea, nausea and vomiting.   Genitourinary: Negative for difficulty urinating, dysuria, frequency and urgency.   Musculoskeletal: Negative for back pain, joint  swelling and neck pain.   Skin: Negative for rash.   Neurological: Negative for dizziness, seizures, weakness, light-headedness, numbness and headaches.   Hematological: Negative for adenopathy.   Psychiatric/Behavioral: Negative for agitation. The patient is not nervous/anxious.          History  Past Medical History:   Diagnosis Date   • ADHD (attention deficit hyperactivity disorder)     as a child   , Past Surgical History:   Procedure Laterality Date   • TESTICLE TORSION REPAIR     , Family History   Problem Relation Age of Onset   • Hypertension Mother    • Diabetes Paternal Grandmother    , Social History   Substance Use Topics   • Smoking status: Current Every Day Smoker     Packs/day: 1.00     Types: Cigarettes   • Smokeless tobacco: Former User     Types: Chew   • Alcohol use No   , Home Medications:  Prior to Admission medications    Medication Sig Start Date End Date Taking? Authorizing Provider   clindamycin (CLEOCIN) 300 MG capsule Take 1 capsule by mouth 4 (Four) Times a Day. 4/17/17 3/28/18  Kandis Johnson, APRN   , Scheduled Meds:    nicotine 1 patch Transdermal Q24H   , Continuous Infusions:    sodium chloride 125 mL/hr Last Rate: 125 mL/hr (03/28/18 0135)   , PRN Meds:  •  Morphine **AND** naloxone  •  ondansetron  •  sodium chloride  •  Insert peripheral IV **AND** sodium chloride and Allergies:  Allergies   Allergen Reactions   • Lemon Extract [Flavoring Agent] Anaphylaxis   • Penicillins Rash       Objective     Vital Signs   Temp:  [96.8 °F (36 °C)-98 °F (36.7 °C)] 96.8 °F (36 °C)  Heart Rate:  [] 89  Resp:  [18-20] 18  BP: ()/(55-66) 134/57    Physical Exam:     General Appearance:    Alert, cooperative, in no acute distress   Head:    Normocephalic, without obvious abnormality, atraumatic   Eyes:            Lids and lashes normal, conjunctivae and sclerae normal, no   icterus, no pallor, corneas clear   Ears:    Ears appear intact with no abnormalities noted   Throat:   No  oral lesions, no thrush, oral mucosa moist   Neck:   No adenopathy, supple, trachea midline, no thyromegaly,   no JVD   Lungs:     Clear to auscultation,respirations regular, even and                  unlabored    Heart:    Regular rhythm and normal rate, normal S1 and S2, no            murmur, no gallop, no rub, no click   Chest Wall:    No abnormalities observed   Abdomen:     Normal bowel sounds, no masses, no organomegaly, soft        And mildly tender in right upper quadrant and upper epigastric without peritoneal signs    Extremities:   Moves all extremities well, no edema, no cyanosis, no             redness   Skin:   No bleeding, bruising or rash   Lymph nodes:   No palpable adenopathy   Neurologic:  Grossly intact, sensation intact        Assessment/Plan cholecystitis and cholelithiasis.  I would recommend patient undergo laparoscopic cholecystectomy and interoperative cholangiogram.  I fully explained the procedure alternatives risk and benefits to the patient.  He clearly understands he isn't increased risk for an open procedure due to the way his gallbladder appears on ultrasound.  He clearly understands and wishes to proceed    Patient agrees to undergo laparoscopic cholecystectomy and interoperative cholangiogram.  I have fully discussed the procedure risks, benefits and alternatives with the patient.  They understand the risk include but are not limited to infection, bleeding, injury to intra-abdominal organs and specifically to the common bile duct.  All questions have been answered and the patient wishes to proceed with laparoscopic cholecystectomy and intraoperative cholangiogram.    Active Problems:    Acute cholecystitis        I discussed the patients findings and my recommendations with patient and nursing staff        This document has been electronically signed by Ras Griggs MD on March 28, 2018 8:28 AM     Ras Griggs MD  03/28/18  8:28 AM              Electronically signed by  Ras Griggs MD at 3/28/2018  8:35 AM

## 2018-03-28 NOTE — PLAN OF CARE
Problem: Patient Care Overview  Goal: Plan of Care Review  Outcome: Ongoing (interventions implemented as appropriate)    Goal: Individualization and Mutuality  Outcome: Ongoing (interventions implemented as appropriate)    Goal: Discharge Needs Assessment  Outcome: Ongoing (interventions implemented as appropriate)      Problem: Pain, Acute (Adult)  Goal: Identify Related Risk Factors and Signs and Symptoms  Outcome: Ongoing (interventions implemented as appropriate)    Goal: Acceptable Pain Control/Comfort Level  Outcome: Ongoing (interventions implemented as appropriate)

## 2018-03-28 NOTE — DISCHARGE SUMMARY
Discharge Summary    Date of Admission: 3/27/2018  Date of Discharge:  3/28/2018  Service: General Surgery  Attending: Ras Griggs     Consults:   Consults     Date and Time Order Name Status Description    3/27/2018 2346 Inpatient General Surgery Consult Completed     3/27/2018 2345 Hospitalist (on-call MD unless specified)          Discharge Diagnoses:   Hospital Problem List     * (Principal)Cholecystitis    Overview Signed 3/28/2018  8:45 AM by Ras Griggs MD     Added automatically from request for surgery [id number]         Acute cholecystitis          Hospital Course:  Admitted with a diagnosis of cholecystitis.  Due to emergencies in the operating room and unable to do his surgery today.  Offered to keep him in the hospital tonight that he wishes to be discharged with the plan to do his surgery on Friday afternoon per his request.  Patient's pain is much improved with just IV fluids and he's afebrile and is hungry currently.  Tomorrow to set up a surgery for Friday.  Spoke with the hospitalist service and they're okay with this plan.    Discharge Condition:  stable  Discharge Medications:None     Your medication list      You have not been prescribed any medications.           Activity as instructed by Dr. Griggs      Regular Diet              This document has been electronically signed by Ras Griggs MD on March 28, 2018 6:18 PM

## 2018-03-29 ENCOUNTER — PREP FOR SURGERY (OUTPATIENT)
Dept: OTHER | Facility: HOSPITAL | Age: 23
End: 2018-03-29

## 2018-03-29 DIAGNOSIS — K81.9 CHOLECYSTITIS: Primary | ICD-10-CM

## 2018-03-29 RX ORDER — LEVOFLOXACIN 5 MG/ML
500 INJECTION, SOLUTION INTRAVENOUS ONCE
Status: CANCELLED | OUTPATIENT
Start: 2018-03-30 | End: 2018-03-30

## 2018-03-30 ENCOUNTER — ANESTHESIA (OUTPATIENT)
Dept: PERIOP | Facility: HOSPITAL | Age: 23
End: 2018-03-30

## 2018-03-30 ENCOUNTER — HOSPITAL ENCOUNTER (OUTPATIENT)
Facility: HOSPITAL | Age: 23
Discharge: HOME | End: 2018-03-31
Attending: SURGERY | Admitting: SURGERY

## 2018-03-30 ENCOUNTER — ANESTHESIA EVENT (OUTPATIENT)
Dept: PERIOP | Facility: HOSPITAL | Age: 23
End: 2018-03-30

## 2018-03-30 ENCOUNTER — APPOINTMENT (OUTPATIENT)
Dept: GENERAL RADIOLOGY | Facility: HOSPITAL | Age: 23
End: 2018-03-30

## 2018-03-30 DIAGNOSIS — K81.9 CHOLECYSTITIS: ICD-10-CM

## 2018-03-30 LAB
AMPHET+METHAMPHET UR QL: NEGATIVE
BARBITURATES UR QL SCN: NEGATIVE
BENZODIAZ UR QL SCN: NEGATIVE
CANNABINOIDS SERPL QL: POSITIVE
COCAINE UR QL: NEGATIVE
METHADONE UR QL SCN: NEGATIVE
OPIATES UR QL: NEGATIVE
OXYCODONE UR QL SCN: NEGATIVE

## 2018-03-30 PROCEDURE — 80307 DRUG TEST PRSMV CHEM ANLYZR: CPT | Performed by: ANESTHESIOLOGY

## 2018-03-30 PROCEDURE — 88304 TISSUE EXAM BY PATHOLOGIST: CPT | Performed by: PATHOLOGY

## 2018-03-30 PROCEDURE — 47562 LAPAROSCOPIC CHOLECYSTECTOMY: CPT | Performed by: SURGERY

## 2018-03-30 PROCEDURE — 25010000002 MIDAZOLAM PER 1 MG: Performed by: NURSE ANESTHETIST, CERTIFIED REGISTERED

## 2018-03-30 PROCEDURE — 25010000002 ONDANSETRON PER 1 MG: Performed by: NURSE ANESTHETIST, CERTIFIED REGISTERED

## 2018-03-30 PROCEDURE — 25010000002 LEVOFLOXACIN PER 250 MG: Performed by: SURGERY

## 2018-03-30 PROCEDURE — 88304 TISSUE EXAM BY PATHOLOGIST: CPT | Performed by: SURGERY

## 2018-03-30 PROCEDURE — 25010000002 DEXAMETHASONE PER 1 MG: Performed by: NURSE ANESTHETIST, CERTIFIED REGISTERED

## 2018-03-30 PROCEDURE — 25010000002 NEOSTIGMINE PER 0.5 MG: Performed by: NURSE ANESTHETIST, CERTIFIED REGISTERED

## 2018-03-30 PROCEDURE — 25010000002 FENTANYL CITRATE (PF) 100 MCG/2ML SOLUTION: Performed by: NURSE ANESTHETIST, CERTIFIED REGISTERED

## 2018-03-30 PROCEDURE — 25010000002 PROPOFOL 10 MG/ML EMULSION: Performed by: NURSE ANESTHETIST, CERTIFIED REGISTERED

## 2018-03-30 RX ORDER — ROCURONIUM BROMIDE 10 MG/ML
INJECTION, SOLUTION INTRAVENOUS AS NEEDED
Status: DISCONTINUED | OUTPATIENT
Start: 2018-03-30 | End: 2018-03-30 | Stop reason: SURG

## 2018-03-30 RX ORDER — NALOXONE HCL 0.4 MG/ML
0.4 VIAL (ML) INJECTION
Status: DISCONTINUED | OUTPATIENT
Start: 2018-03-30 | End: 2018-03-31 | Stop reason: HOSPADM

## 2018-03-30 RX ORDER — DEXAMETHASONE SODIUM PHOSPHATE 4 MG/ML
INJECTION, SOLUTION INTRA-ARTICULAR; INTRALESIONAL; INTRAMUSCULAR; INTRAVENOUS; SOFT TISSUE AS NEEDED
Status: DISCONTINUED | OUTPATIENT
Start: 2018-03-30 | End: 2018-03-30 | Stop reason: SURG

## 2018-03-30 RX ORDER — LABETALOL HYDROCHLORIDE 5 MG/ML
5 INJECTION, SOLUTION INTRAVENOUS
Status: DISCONTINUED | OUTPATIENT
Start: 2018-03-30 | End: 2018-03-30 | Stop reason: HOSPADM

## 2018-03-30 RX ORDER — MIDAZOLAM HYDROCHLORIDE 1 MG/ML
INJECTION INTRAMUSCULAR; INTRAVENOUS AS NEEDED
Status: DISCONTINUED | OUTPATIENT
Start: 2018-03-30 | End: 2018-03-30 | Stop reason: SURG

## 2018-03-30 RX ORDER — ONDANSETRON 2 MG/ML
4 INJECTION INTRAMUSCULAR; INTRAVENOUS ONCE AS NEEDED
Status: DISCONTINUED | OUTPATIENT
Start: 2018-03-30 | End: 2018-03-30 | Stop reason: HOSPADM

## 2018-03-30 RX ORDER — FLUMAZENIL 0.1 MG/ML
0.2 INJECTION INTRAVENOUS AS NEEDED
Status: DISCONTINUED | OUTPATIENT
Start: 2018-03-30 | End: 2018-03-30 | Stop reason: HOSPADM

## 2018-03-30 RX ORDER — GLYCOPYRROLATE 0.2 MG/ML
INJECTION INTRAMUSCULAR; INTRAVENOUS AS NEEDED
Status: DISCONTINUED | OUTPATIENT
Start: 2018-03-30 | End: 2018-03-30 | Stop reason: SURG

## 2018-03-30 RX ORDER — LEVOFLOXACIN 5 MG/ML
500 INJECTION, SOLUTION INTRAVENOUS EVERY 24 HOURS
Status: DISCONTINUED | OUTPATIENT
Start: 2018-03-31 | End: 2018-03-31 | Stop reason: HOSPADM

## 2018-03-30 RX ORDER — ACETAMINOPHEN 325 MG/1
650 TABLET ORAL ONCE AS NEEDED
Status: DISCONTINUED | OUTPATIENT
Start: 2018-03-30 | End: 2018-03-30 | Stop reason: HOSPADM

## 2018-03-30 RX ORDER — EPHEDRINE SULFATE 50 MG/ML
5 INJECTION, SOLUTION INTRAVENOUS ONCE AS NEEDED
Status: DISCONTINUED | OUTPATIENT
Start: 2018-03-30 | End: 2018-03-30 | Stop reason: HOSPADM

## 2018-03-30 RX ORDER — FENTANYL CITRATE 50 UG/ML
INJECTION, SOLUTION INTRAMUSCULAR; INTRAVENOUS AS NEEDED
Status: DISCONTINUED | OUTPATIENT
Start: 2018-03-30 | End: 2018-03-30 | Stop reason: SURG

## 2018-03-30 RX ORDER — BUPIVACAINE HYDROCHLORIDE AND EPINEPHRINE 5; 5 MG/ML; UG/ML
INJECTION, SOLUTION EPIDURAL; INTRACAUDAL; PERINEURAL AS NEEDED
Status: DISCONTINUED | OUTPATIENT
Start: 2018-03-30 | End: 2018-03-31 | Stop reason: HOSPADM

## 2018-03-30 RX ORDER — NALOXONE HCL 0.4 MG/ML
0.2 VIAL (ML) INJECTION AS NEEDED
Status: DISCONTINUED | OUTPATIENT
Start: 2018-03-30 | End: 2018-03-30 | Stop reason: HOSPADM

## 2018-03-30 RX ORDER — LIDOCAINE HYDROCHLORIDE 20 MG/ML
INJECTION, SOLUTION INFILTRATION; PERINEURAL AS NEEDED
Status: DISCONTINUED | OUTPATIENT
Start: 2018-03-30 | End: 2018-03-30 | Stop reason: SURG

## 2018-03-30 RX ORDER — ACETAMINOPHEN 650 MG/1
650 SUPPOSITORY RECTAL ONCE AS NEEDED
Status: DISCONTINUED | OUTPATIENT
Start: 2018-03-30 | End: 2018-03-30 | Stop reason: HOSPADM

## 2018-03-30 RX ORDER — DEXTROSE, SODIUM CHLORIDE, AND POTASSIUM CHLORIDE 5; .9; .15 G/100ML; G/100ML; G/100ML
100 INJECTION INTRAVENOUS CONTINUOUS
Status: DISCONTINUED | OUTPATIENT
Start: 2018-03-30 | End: 2018-03-31 | Stop reason: HOSPADM

## 2018-03-30 RX ORDER — SODIUM CHLORIDE, SODIUM GLUCONATE, SODIUM ACETATE, POTASSIUM CHLORIDE, AND MAGNESIUM CHLORIDE 526; 502; 368; 37; 30 MG/100ML; MG/100ML; MG/100ML; MG/100ML; MG/100ML
1000 INJECTION, SOLUTION INTRAVENOUS CONTINUOUS PRN
Status: DISCONTINUED | OUTPATIENT
Start: 2018-03-30 | End: 2018-03-30 | Stop reason: HOSPADM

## 2018-03-30 RX ORDER — LEVOFLOXACIN 5 MG/ML
500 INJECTION, SOLUTION INTRAVENOUS ONCE
Status: COMPLETED | OUTPATIENT
Start: 2018-03-30 | End: 2018-03-30

## 2018-03-30 RX ORDER — MEPERIDINE HYDROCHLORIDE 50 MG/ML
12.5 INJECTION INTRAMUSCULAR; INTRAVENOUS; SUBCUTANEOUS
Status: DISCONTINUED | OUTPATIENT
Start: 2018-03-30 | End: 2018-03-30 | Stop reason: HOSPADM

## 2018-03-30 RX ORDER — DIPHENHYDRAMINE HYDROCHLORIDE 50 MG/ML
12.5 INJECTION INTRAMUSCULAR; INTRAVENOUS
Status: DISCONTINUED | OUTPATIENT
Start: 2018-03-30 | End: 2018-03-30 | Stop reason: HOSPADM

## 2018-03-30 RX ORDER — ONDANSETRON 2 MG/ML
INJECTION INTRAMUSCULAR; INTRAVENOUS AS NEEDED
Status: DISCONTINUED | OUTPATIENT
Start: 2018-03-30 | End: 2018-03-30 | Stop reason: SURG

## 2018-03-30 RX ORDER — HYDROCODONE BITARTRATE AND ACETAMINOPHEN 7.5; 325 MG/1; MG/1
1 TABLET ORAL EVERY 4 HOURS PRN
Status: DISCONTINUED | OUTPATIENT
Start: 2018-03-30 | End: 2018-03-31 | Stop reason: HOSPADM

## 2018-03-30 RX ORDER — BACTERIOSTATIC SODIUM CHLORIDE 0.9 %
VIAL (ML) INJECTION AS NEEDED
Status: DISCONTINUED | OUTPATIENT
Start: 2018-03-30 | End: 2018-03-31 | Stop reason: HOSPADM

## 2018-03-30 RX ORDER — PROPOFOL 10 MG/ML
VIAL (ML) INTRAVENOUS AS NEEDED
Status: DISCONTINUED | OUTPATIENT
Start: 2018-03-30 | End: 2018-03-30 | Stop reason: SURG

## 2018-03-30 RX ADMIN — LIDOCAINE HYDROCHLORIDE 50 MG: 20 INJECTION, SOLUTION INFILTRATION; PERINEURAL at 15:51

## 2018-03-30 RX ADMIN — HYDROMORPHONE HYDROCHLORIDE 0.5 MG: 10 INJECTION, SOLUTION INTRAMUSCULAR; INTRAVENOUS; SUBCUTANEOUS at 16:40

## 2018-03-30 RX ADMIN — ONDANSETRON 4 MG: 2 INJECTION INTRAMUSCULAR; INTRAVENOUS at 14:58

## 2018-03-30 RX ADMIN — FENTANYL CITRATE 50 MCG: 50 INJECTION, SOLUTION INTRAMUSCULAR; INTRAVENOUS at 15:30

## 2018-03-30 RX ADMIN — ROCURONIUM BROMIDE 70 MG: 10 INJECTION INTRAVENOUS at 14:25

## 2018-03-30 RX ADMIN — LEVOFLOXACIN 500 MG: 5 INJECTION, SOLUTION INTRAVENOUS at 14:35

## 2018-03-30 RX ADMIN — DEXAMETHASONE SODIUM PHOSPHATE 4 MG: 4 INJECTION, SOLUTION INTRAMUSCULAR; INTRAVENOUS at 14:40

## 2018-03-30 RX ADMIN — LIDOCAINE HYDROCHLORIDE 50 MG: 20 INJECTION, SOLUTION INFILTRATION; PERINEURAL at 14:25

## 2018-03-30 RX ADMIN — POTASSIUM CHLORIDE, DEXTROSE MONOHYDRATE AND SODIUM CHLORIDE 100 ML/HR: 150; 5; 900 INJECTION, SOLUTION INTRAVENOUS at 18:20

## 2018-03-30 RX ADMIN — SODIUM CHLORIDE, SODIUM GLUCONATE, SODIUM ACETATE, POTASSIUM CHLORIDE, AND MAGNESIUM CHLORIDE: 526; 502; 368; 37; 30 INJECTION, SOLUTION INTRAVENOUS at 14:19

## 2018-03-30 RX ADMIN — MIDAZOLAM 2 MG: 1 INJECTION INTRAMUSCULAR; INTRAVENOUS at 14:18

## 2018-03-30 RX ADMIN — HYDROMORPHONE HYDROCHLORIDE 0.5 MG: 10 INJECTION, SOLUTION INTRAMUSCULAR; INTRAVENOUS; SUBCUTANEOUS at 22:12

## 2018-03-30 RX ADMIN — FENTANYL CITRATE 50 MCG: 50 INJECTION, SOLUTION INTRAMUSCULAR; INTRAVENOUS at 14:35

## 2018-03-30 RX ADMIN — Medication 3 MG: at 15:43

## 2018-03-30 RX ADMIN — HYDROMORPHONE HYDROCHLORIDE 0.5 MG: 10 INJECTION, SOLUTION INTRAMUSCULAR; INTRAVENOUS; SUBCUTANEOUS at 19:41

## 2018-03-30 RX ADMIN — FENTANYL CITRATE 50 MCG: 50 INJECTION, SOLUTION INTRAMUSCULAR; INTRAVENOUS at 15:59

## 2018-03-30 RX ADMIN — PROPOFOL 200 MG: 10 INJECTION, EMULSION INTRAVENOUS at 14:25

## 2018-03-30 RX ADMIN — GLYCOPYRROLATE 0.6 MG: 0.2 INJECTION, SOLUTION INTRAMUSCULAR; INTRAVENOUS at 15:43

## 2018-03-30 RX ADMIN — HYDROCODONE BITARTRATE AND ACETAMINOPHEN 1 TABLET: 7.5; 325 TABLET ORAL at 18:19

## 2018-03-30 RX ADMIN — HYDROMORPHONE HYDROCHLORIDE 0.5 MG: 10 INJECTION, SOLUTION INTRAMUSCULAR; INTRAVENOUS; SUBCUTANEOUS at 16:13

## 2018-03-30 RX ADMIN — FENTANYL CITRATE 100 MCG: 50 INJECTION, SOLUTION INTRAMUSCULAR; INTRAVENOUS at 14:18

## 2018-03-30 NOTE — ANESTHESIA PROCEDURE NOTES
Airway  Urgency: elective    Airway not difficult    General Information and Staff    Patient location during procedure: OR  CRNA: FELICIA COPPOLA    Indications and Patient Condition  Indications for airway management: airway protection    Preoxygenated: yes  MILS maintained throughout  Mask difficulty assessment: 1 - vent by mask    Final Airway Details  Final airway type: endotracheal airway      Successful airway: ETT    Endotracheal tube insertion site: oral  Blade: Grubbs  Blade size: #3  ETT size: 7.5 mm  Placement verified by: chest auscultation and capnometry   Measured from: teeth  ETT to teeth (cm): 22  Number of attempts at approach: 1

## 2018-03-30 NOTE — ANESTHESIA POSTPROCEDURE EVALUATION
Patient: Javed Hair    Procedure Summary     Date:  03/30/18 Room / Location:  [facility] OR 09 / [facility] OR    Anesthesia Start:  1419 Anesthesia Stop:  1604    Procedure:  SUBTOTAL CHOLECYSTECTOMY LAPAROSCOPIC (N/A Abdomen) Diagnosis:       Cholecystitis      (Cholecystitis [K81.9])    Surgeon:  Ras Griggs MD Provider:  Eligio Rogers MD    Anesthesia Type:  general ASA Status:  3          Anesthesia Type: general  Last vitals  BP   119/76 (03/30/18 1044)   Temp   98.5 °F (36.9 °C) (03/30/18 1044)   Pulse   80 (03/30/18 1044)   Resp   18 (03/30/18 1044)     SpO2   99 % (03/30/18 1044)     Post Anesthesia Care and Evaluation    Patient location during evaluation: PACU  Patient participation: complete - patient participated  Level of consciousness: awake and alert  Pain score: 4  Pain management: adequate  Airway patency: patent  Anesthetic complications: No anesthetic complications  PONV Status: none  Cardiovascular status: acceptable  Respiratory status: acceptable  Hydration status: acceptable

## 2018-03-30 NOTE — ANESTHESIA PREPROCEDURE EVALUATION
Anesthesia Evaluation     no history of anesthetic complications:  NPO Solid Status: > 8 hours  NPO Liquid Status: > 8 hours           Airway   Mallampati: I  TM distance: >3 FB  Neck ROM: full  No difficulty expected  Dental    (+) poor dentition    Pulmonary - normal exam    breath sounds clear to auscultation  (+) a smoker (1 ppd) Current Smoked day of surgery,   Cardiovascular - normal exam  Exercise tolerance: good (4-7 METS)    ECG reviewed  Rhythm: regular  Rate: normal    (-) angina, murmur    ROS comment: Sinus tachycardia  Otherwise normal ECG  When compared with ECG of 20-APR-2017 10:31,  Sinus rhythm has replaced Atrial flutter  Confirmed by JAY JAY VIEIRA MD ([id number]) on 4/25/2017 7:04:54 PM    Referred By:             Confirmed By:JAY JAY VIEIRA MD    Neuro/Psych  (+) psychiatric history (ADHD),       ROS Comment: ADHD as a child  GI/Hepatic/Renal/Endo    (+)  GERD well controlled,      ROS Comment: Pain 4/10  Slight nausea without vomiting    Musculoskeletal     (+) back pain,   Abdominal    Substance History   (+) alcohol use (occ), drug use (marijuana)     OB/GYN          Other - negative ROS                         Anesthesia Plan    ASA 3     general     intravenous induction   Anesthetic plan and risks discussed with patient and father.

## 2018-03-31 VITALS
OXYGEN SATURATION: 97 % | TEMPERATURE: 96.9 F | DIASTOLIC BLOOD PRESSURE: 60 MMHG | HEART RATE: 75 BPM | BODY MASS INDEX: 22.99 KG/M2 | SYSTOLIC BLOOD PRESSURE: 113 MMHG | WEIGHT: 169.75 LBS | RESPIRATION RATE: 18 BRPM | HEIGHT: 72 IN

## 2018-03-31 LAB
ALBUMIN SERPL-MCNC: 3.5 G/DL (ref 3.4–4.8)
ALBUMIN/GLOB SERPL: 1.1 G/DL (ref 1.1–1.8)
ALP SERPL-CCNC: 79 U/L (ref 38–126)
ALT SERPL W P-5'-P-CCNC: 71 U/L (ref 21–72)
ANION GAP SERPL CALCULATED.3IONS-SCNC: 11 MMOL/L (ref 5–15)
AST SERPL-CCNC: 45 U/L (ref 17–59)
BASOPHILS # BLD AUTO: 0.01 10*3/MM3 (ref 0–0.2)
BASOPHILS NFR BLD AUTO: 0.1 % (ref 0–2)
BILIRUB SERPL-MCNC: 0.4 MG/DL (ref 0.2–1.3)
BUN BLD-MCNC: 8 MG/DL (ref 7–21)
BUN/CREAT SERPL: 9.9 (ref 7–25)
CALCIUM SPEC-SCNC: 8.7 MG/DL (ref 8.4–10.2)
CHLORIDE SERPL-SCNC: 102 MMOL/L (ref 95–110)
CO2 SERPL-SCNC: 28 MMOL/L (ref 22–31)
CREAT BLD-MCNC: 0.81 MG/DL (ref 0.7–1.3)
DEPRECATED RDW RBC AUTO: 41.7 FL (ref 35.1–43.9)
EOSINOPHIL # BLD AUTO: 0.06 10*3/MM3 (ref 0–0.7)
EOSINOPHIL NFR BLD AUTO: 0.6 % (ref 0–7)
ERYTHROCYTE [DISTWIDTH] IN BLOOD BY AUTOMATED COUNT: 13.1 % (ref 11.5–14.5)
GFR SERPL CREATININE-BSD FRML MDRD: 118 ML/MIN/1.73 (ref 77–179)
GLOBULIN UR ELPH-MCNC: 3.3 GM/DL (ref 2.3–3.5)
GLUCOSE BLD-MCNC: 114 MG/DL (ref 60–100)
HCT VFR BLD AUTO: 37.2 % (ref 39–49)
HGB BLD-MCNC: 12.5 G/DL (ref 13.7–17.3)
IMM GRANULOCYTES # BLD: 0.02 10*3/MM3 (ref 0–0.02)
IMM GRANULOCYTES NFR BLD: 0.2 % (ref 0–0.5)
LYMPHOCYTES # BLD AUTO: 1.67 10*3/MM3 (ref 0.6–4.2)
LYMPHOCYTES NFR BLD AUTO: 16.7 % (ref 10–50)
MCH RBC QN AUTO: 29 PG (ref 26.5–34)
MCHC RBC AUTO-ENTMCNC: 33.6 G/DL (ref 31.5–36.3)
MCV RBC AUTO: 86.3 FL (ref 80–98)
MONOCYTES # BLD AUTO: 0.87 10*3/MM3 (ref 0–0.9)
MONOCYTES NFR BLD AUTO: 8.7 % (ref 0–12)
NEUTROPHILS # BLD AUTO: 7.38 10*3/MM3 (ref 2–8.6)
NEUTROPHILS NFR BLD AUTO: 73.7 % (ref 37–80)
PLATELET # BLD AUTO: 338 10*3/MM3 (ref 150–450)
PMV BLD AUTO: 10.2 FL (ref 8–12)
POTASSIUM BLD-SCNC: 4.9 MMOL/L (ref 3.5–5.1)
PROT SERPL-MCNC: 6.8 G/DL (ref 6.3–8.6)
RBC # BLD AUTO: 4.31 10*6/MM3 (ref 4.37–5.74)
SODIUM BLD-SCNC: 141 MMOL/L (ref 137–145)
WBC NRBC COR # BLD: 10.01 10*3/MM3 (ref 3.2–9.8)

## 2018-03-31 PROCEDURE — 85025 COMPLETE CBC W/AUTO DIFF WBC: CPT | Performed by: SURGERY

## 2018-03-31 PROCEDURE — 80053 COMPREHEN METABOLIC PANEL: CPT | Performed by: SURGERY

## 2018-03-31 PROCEDURE — 25010000002 ENOXAPARIN PER 10 MG: Performed by: SURGERY

## 2018-03-31 RX ORDER — OXYCODONE HYDROCHLORIDE AND ACETAMINOPHEN 5; 325 MG/1; MG/1
1 TABLET ORAL EVERY 6 HOURS PRN
Qty: 25 TABLET | Refills: 0 | OUTPATIENT
Start: 2018-03-31 | End: 2020-06-17

## 2018-03-31 RX ADMIN — POTASSIUM CHLORIDE, DEXTROSE MONOHYDRATE AND SODIUM CHLORIDE 100 ML/HR: 150; 5; 900 INJECTION, SOLUTION INTRAVENOUS at 04:05

## 2018-03-31 RX ADMIN — HYDROMORPHONE HYDROCHLORIDE 0.5 MG: 10 INJECTION, SOLUTION INTRAMUSCULAR; INTRAVENOUS; SUBCUTANEOUS at 04:05

## 2018-03-31 RX ADMIN — HYDROCODONE BITARTRATE AND ACETAMINOPHEN 1 TABLET: 7.5; 325 TABLET ORAL at 01:00

## 2018-03-31 RX ADMIN — HYDROCODONE BITARTRATE AND ACETAMINOPHEN 1 TABLET: 7.5; 325 TABLET ORAL at 08:32

## 2018-03-31 RX ADMIN — ENOXAPARIN SODIUM 40 MG: 40 INJECTION SUBCUTANEOUS at 08:32

## 2018-04-03 LAB
LAB AP CASE REPORT: NORMAL
Lab: NORMAL
PATH REPORT.FINAL DX SPEC: NORMAL
PATH REPORT.GROSS SPEC: NORMAL

## 2018-04-06 ENCOUNTER — OFFICE VISIT (OUTPATIENT)
Dept: SURGERY | Facility: CLINIC | Age: 23
End: 2018-04-06

## 2018-04-06 VITALS
WEIGHT: 166 LBS | DIASTOLIC BLOOD PRESSURE: 60 MMHG | SYSTOLIC BLOOD PRESSURE: 102 MMHG | BODY MASS INDEX: 22.48 KG/M2 | HEIGHT: 72 IN

## 2018-04-06 DIAGNOSIS — K81.9 CHOLECYSTITIS: Primary | ICD-10-CM

## 2018-04-06 PROCEDURE — 99024 POSTOP FOLLOW-UP VISIT: CPT | Performed by: SURGERY

## 2018-04-06 NOTE — PATIENT INSTRUCTIONS
MyPlate from [employer]  The general, healthful diet is based on the 2010 Dietary Guidelines for Americans. The amount of food you need to eat from each food group depends on your age, sex, and level of physical activity and can be individualized by a dietitian. Go to ChooseMyPlate.gov for more information.  What do I need to know about the MyPlate plan?  · Enjoy your food, but eat less.  · Avoid oversized portions.  ¨ ½ of your plate should include fruits and vegetables.  ¨ ¼ of your plate should be grains.  ¨ ¼ of your plate should be protein.  Grains   · Make at least half of your grains whole grains.  · For a 2,000 calorie daily food plan, eat 6 oz every day.  · 1 oz is about 1 slice bread, 1 cup cereal, or ½ cup cooked rice, cereal, or pasta.  Vegetables   · Make half your plate fruits and vegetables.  · For a 2,000 calorie daily food plan, eat 2½ cups every day.  · 1 cup is about 1 cup raw or cooked vegetables or vegetable juice or 2 cups raw leafy greens.  Fruits   · Make half your plate fruits and vegetables.  · For a 2,000 calorie daily food plan, eat 2 cups every day.  · 1 cup is about 1 cup fruit or 100% fruit juice or ½ cup dried fruit.  Protein   · For a 2,000 calorie daily food plan, eat 5½ oz every day.  · 1 oz is about 1 oz meat, poultry, or fish, ¼ cup cooked beans, 1 egg, 1 Tbsp peanut butter, or ½ oz nuts or seeds.  Dairy   · Switch to fat-free or low-fat (1%) milk.  · For a 2,000 calorie daily food plan, eat 3 cups every day.  · 1 cup is about 1 cup milk or yogurt or soy milk (soy beverage), 1½ oz natural cheese, or 2 oz processed cheese.  Fats, Oils, and Empty Calories   · Only small amounts of oils are recommended.  · Empty calories are calories from solid fats or added sugars.  · Compare sodium in foods like soup, bread, and frozen meals. Choose the foods with lower numbers.  · Drink water instead of sugary drinks.  What foods can I eat?  Grains   Whole grains such as whole wheat, quinoa, millet,  and bulgur. Bread, rolls, and pasta made from whole grains. Brown or wild rice. Hot or cold cereals made from whole grains and without added sugar.  Vegetables   All fresh vegetables, especially fresh red, dark green, or orange vegetables. Peas and beans. Low-sodium frozen or canned vegetables prepared without added salt. Low-sodium vegetable juices.  Fruits   All fresh, frozen, and dried fruits. Canned fruit packed in water or fruit juice without added sugar. Fruit juices without added sugar.  Meats and Other Protein Sources   Boiled, baked, or grilled lean meat trimmed of fat. Skinless poultry. Fresh seafood and shellfish. Canned seafood packed in water. Unsalted nuts and unsalted nut butters. Tofu. Dried beans and pea. Eggs.  Dairy   Low-fat or fat-free milk, yogurt, and cheeses.  Sweets and Desserts   Frozen desserts made from low-fat milk.  Fats and Oils   Olive, peanut, and canola oils and margarine. Salad dressing and mayonnaise made from these oils.  Other   Soups and casseroles made from allowed ingredients and without added fat or salt.  The items listed above may not be a complete list of recommended foods or beverages. Contact your dietitian for more options.   What foods are not recommended?  Grains   Sweetened, low-fiber cereals. Packaged baked goods. Snack crackers and chips. Cheese crackers, butter crackers, and biscuits. Frozen waffles, sweet breads, doughnuts, pastries, packaged baking mixes, pancakes, cakes, and cookies.  Vegetables   Regular canned or frozen vegetables or vegetables prepared with salt. Canned tomatoes. Canned tomato sauce. Fried vegetables. Vegetables in cream sauce or cheese sauce.  Fruits   Fruits packed in syrup or made with added sugar.  Meats and Other Protein Sources   Marbled or fatty meats such as ribs. Poultry with skin. Fried meats, poultry, eggs, or fish. Sausages, hot dogs, and deli meats such as pastrami, bologna, or salami.  Dairy   Whole milk, cream, cheeses made  from whole milk, sour cream. Ice cream or yogurt made from whole milk or with added sugar.  Beverages   For adults, no more than one alcoholic drink per day. Regular soft drinks or other sugary beverages. Juice drinks.  Sweets and Desserts   Sugary or fatty desserts, candy, and other sweets.  Fats and Oils   Solid shortening or partially hydrogenated oils. Solid margarine. Margarine that contains trans fats. Butter.  The items listed above may not be a complete list of foods and beverages to avoid. Contact your dietitian for more information.   This information is not intended to replace advice given to you by your health care provider. Make sure you discuss any questions you have with your health care provider.  Document Released: 01/06/2009 Document Revised: 05/25/2017 Document Reviewed: 11/26/2014  [employer] Interactive Patient Education © 2017 [employer]  For more information:    Quit Now [place]  1-800-QUIT-NOW  https://[place].quitlogix.org/en-[place]/  Steps to Quit Smoking  Smoking tobacco can be harmful to your health and can affect almost every organ in your body. Smoking puts you, and those around you, at risk for developing many serious chronic diseases. Quitting smoking is difficult, but it is one of the best things that you can do for your health. It is never too late to quit.  What are the benefits of quitting smoking?  When you quit smoking, you lower your risk of developing serious diseases and conditions, such as:  · Lung cancer or lung disease, such as COPD.  · Heart disease.  · Stroke.  · Heart attack.  · Infertility.  · Osteoporosis and bone fractures.  Additionally, symptoms such as coughing, wheezing, and shortness of breath may get better when you quit. You may also find that you get sick less often because your body is stronger at fighting off colds and infections. If you are pregnant, quitting smoking can help to reduce your chances of having a baby of low birth weight.  How do I get ready  to quit?  When you decide to quit smoking, create a plan to make sure that you are successful. Before you quit:  · Pick a date to quit. Set a date within the next two weeks to give you time to prepare.  · Write down the reasons why you are quitting. Keep this list in places where you will see it often, such as on your bathroom mirror or in your car or wallet.  · Identify the people, places, things, and activities that make you want to smoke (triggers) and avoid them. Make sure to take these actions:  ¨ Throw away all cigarettes at home, at work, and in your car.  ¨ Throw away smoking accessories, such as ashtrays and lighters.  ¨ Clean your car and make sure to empty the ashtray.  ¨ Clean your home, including curtains and carpets.  · Tell your family, friends, and coworkers that you are quitting. Support from your loved ones can make quitting easier.  · Talk with your health care provider about your options for quitting smoking.  · Find out what treatment options are covered by your health insurance.  What strategies can I use to quit smoking?  Talk with your healthcare provider about different strategies to quit smoking. Some strategies include:  · Quitting smoking altogether instead of gradually lessening how much you smoke over a period of time. Research shows that quitting “cold turkey” is more successful than gradually quitting.  · Attending in-person counseling to help you build problem-solving skills. You are more likely to have success in quitting if you attend several counseling sessions. Even short sessions of 10 minutes can be effective.  · Finding resources and support systems that can help you to quit smoking and remain smoke-free after you quit. These resources are most helpful when you use them often. They can include:  ¨ Online chats with a counselor.  ¨ Telephone quitlines.  ¨ Printed self-help materials.  ¨ Support groups or group counseling.  ¨ Text messaging programs.  ¨ Mobile phone  applications.  · Taking medicines to help you quit smoking. (If you are pregnant or breastfeeding, talk with your health care provider first.) Some medicines contain nicotine and some do not. Both types of medicines help with cravings, but the medicines that include nicotine help to relieve withdrawal symptoms. Your health care provider may recommend:  ¨ Nicotine patches, gum, or lozenges.  ¨ Nicotine inhalers or sprays.  ¨ Non-nicotine medicine that is taken by mouth.  Talk with your health care provider about combining strategies, such as taking medicines while you are also receiving in-person counseling. Using these two strategies together makes you more likely to succeed in quitting than if you used either strategy on its own.  If you are pregnant or breastfeeding, talk with your health care provider about finding counseling or other support strategies to quit smoking. Do not take medicine to help you quit smoking unless told to do so by your health care provider.  What things can I do to make it easier to quit?  Quitting smoking might feel overwhelming at first, but there is a lot that you can do to make it easier. Take these important actions:  · Reach out to your family and friends and ask that they support and encourage you during this time. Call telephone quitlines, reach out to support groups, or work with a counselor for support.  · Ask people who smoke to avoid smoking around you.  · Avoid places that trigger you to smoke, such as bars, parties, or smoke-break areas at work.  · Spend time around people who do not smoke.  · Lessen stress in your life, because stress can be a smoking trigger for some people. To lessen stress, try:  ¨ Exercising regularly.  ¨ Deep-breathing exercises.  ¨ Yoga.  ¨ Meditating.  ¨ Performing a body scan. This involves closing your eyes, scanning your body from head to toe, and noticing which parts of your body are particularly tense. Purposefully relax the muscles in those  areas.  · Download or purchase mobile phone or tablet apps (applications) that can help you stick to your quit plan by providing reminders, tips, and encouragement. There are many free apps, such as QuitGuide from the CDC (Centers for Disease Control and Prevention). You can find other support for quitting smoking (smoking cessation) through smokefree.gov and other websites.  How will I feel when I quit smoking?  Within the first 24 hours of quitting smoking, you may start to feel some withdrawal symptoms. These symptoms are usually most noticeable 2-3 days after quitting, but they usually do not last beyond 2-3 weeks. Changes or symptoms that you might experience include:  · Mood swings.  · Restlessness, anxiety, or irritation.  · Difficulty concentrating.  · Dizziness.  · Strong cravings for sugary foods in addition to nicotine.  · Mild weight gain.  · Constipation.  · Nausea.  · Coughing or a sore throat.  · Changes in how your medicines work in your body.  · A depressed mood.  · Difficulty sleeping (insomnia).  After the first 2-3 weeks of quitting, you may start to notice more positive results, such as:  · Improved sense of smell and taste.  · Decreased coughing and sore throat.  · Slower heart rate.  · Lower blood pressure.  · Clearer skin.  · The ability to breathe more easily.  · Fewer sick days.  Quitting smoking is very challenging for most people. Do not get discouraged if you are not successful the first time. Some people need to make many attempts to quit before they achieve long-term success. Do your best to stick to your quit plan, and talk with your health care provider if you have any questions or concerns.  This information is not intended to replace advice given to you by your health care provider. Make sure you discuss any questions you have with your health care provider.  Document Released: 12/12/2002 Document Revised: 08/15/2017 Document Reviewed: 05/03/2016  "[employer]" Interactive Patient  Education © 2017 [employer]

## 2018-04-06 NOTE — PROGRESS NOTES
Patient is 8 days out from his laparoscopic subtotal cholecystectomy.  He feels much better.  He has some mild incisional discomfort but overall he feels much better.  His eating a regular diet and has normal bowel function.  He is nonicteric his abdomen is soft and his incisions are healing appropriately.  Patient pamphlet 1 over with him what exactly we did.  He clearly understands that the stone in the neck was gallbladder is still there occluding the cystic duct.  He understands that we remove the anterior wall of his gallbladder and left a drain tube in which we have subsequently been able to removed.  He understands that this is a scar down and should not be an issue for him.  If he has any problems concerns or questions she will follow up with us at that time.

## 2018-09-09 ENCOUNTER — HOSPITAL ENCOUNTER (EMERGENCY)
Facility: HOSPITAL | Age: 23
Discharge: HOME | End: 2018-09-09
Attending: EMERGENCY MEDICINE | Admitting: EMERGENCY MEDICINE

## 2018-09-09 ENCOUNTER — APPOINTMENT (OUTPATIENT)
Dept: GENERAL RADIOLOGY | Facility: HOSPITAL | Age: 23
End: 2018-09-09

## 2018-09-09 VITALS
RESPIRATION RATE: 20 BRPM | WEIGHT: 170 LBS | HEART RATE: 91 BPM | DIASTOLIC BLOOD PRESSURE: 91 MMHG | HEIGHT: 72 IN | OXYGEN SATURATION: 100 % | TEMPERATURE: 97.8 F | SYSTOLIC BLOOD PRESSURE: 131 MMHG | BODY MASS INDEX: 23.03 KG/M2

## 2018-09-09 DIAGNOSIS — R07.9 CHEST PAIN, UNSPECIFIED TYPE: ICD-10-CM

## 2018-09-09 DIAGNOSIS — F41.9 ANXIETY: Primary | ICD-10-CM

## 2018-09-09 LAB
ALBUMIN SERPL-MCNC: 4.7 G/DL (ref 3.4–4.8)
ALBUMIN/GLOB SERPL: 1.3 G/DL (ref 1.1–1.8)
ALP SERPL-CCNC: 72 U/L (ref 38–126)
ALT SERPL W P-5'-P-CCNC: 39 U/L (ref 21–72)
ANION GAP SERPL CALCULATED.3IONS-SCNC: 13 MMOL/L (ref 5–15)
AST SERPL-CCNC: 29 U/L (ref 17–59)
BASOPHILS # BLD AUTO: 0.03 10*3/MM3 (ref 0–0.2)
BASOPHILS NFR BLD AUTO: 0.3 % (ref 0–2)
BILIRUB SERPL-MCNC: 0.8 MG/DL (ref 0.2–1.3)
BUN BLD-MCNC: 13 MG/DL (ref 7–21)
BUN/CREAT SERPL: 13.3 (ref 7–25)
CALCIUM SPEC-SCNC: 9.7 MG/DL (ref 8.4–10.2)
CHLORIDE SERPL-SCNC: 105 MMOL/L (ref 95–110)
CO2 SERPL-SCNC: 21 MMOL/L (ref 22–31)
CREAT BLD-MCNC: 0.98 MG/DL (ref 0.7–1.3)
DEPRECATED RDW RBC AUTO: 40.2 FL (ref 35.1–43.9)
EOSINOPHIL # BLD AUTO: 0.19 10*3/MM3 (ref 0–0.7)
EOSINOPHIL NFR BLD AUTO: 2.2 % (ref 0–7)
ERYTHROCYTE [DISTWIDTH] IN BLOOD BY AUTOMATED COUNT: 13.5 % (ref 11.5–14.5)
ETHANOL BLD-MCNC: <10 MG/DL (ref 0–10)
ETHANOL UR QL: <0.01 %
GFR SERPL CREATININE-BSD FRML MDRD: 94 ML/MIN/1.73 (ref 77–179)
GLOBULIN UR ELPH-MCNC: 3.5 GM/DL (ref 2.3–3.5)
GLUCOSE BLD-MCNC: 100 MG/DL (ref 60–100)
HCT VFR BLD AUTO: 41.8 % (ref 39–49)
HGB BLD-MCNC: 14.9 G/DL (ref 13.7–17.3)
HOLD SPECIMEN: NORMAL
HOLD SPECIMEN: NORMAL
IMM GRANULOCYTES # BLD: 0.02 10*3/MM3 (ref 0–0.02)
IMM GRANULOCYTES NFR BLD: 0.2 % (ref 0–0.5)
LYMPHOCYTES # BLD AUTO: 2.17 10*3/MM3 (ref 0.6–4.2)
LYMPHOCYTES NFR BLD AUTO: 25.2 % (ref 10–50)
MCH RBC QN AUTO: 29.4 PG (ref 26.5–34)
MCHC RBC AUTO-ENTMCNC: 35.6 G/DL (ref 31.5–36.3)
MCV RBC AUTO: 82.4 FL (ref 80–98)
MONOCYTES # BLD AUTO: 1.29 10*3/MM3 (ref 0–0.9)
MONOCYTES NFR BLD AUTO: 15 % (ref 0–12)
NEUTROPHILS # BLD AUTO: 4.9 10*3/MM3 (ref 2–8.6)
NEUTROPHILS NFR BLD AUTO: 57.1 % (ref 37–80)
PLATELET # BLD AUTO: 301 10*3/MM3 (ref 150–450)
PMV BLD AUTO: 10.2 FL (ref 8–12)
POTASSIUM BLD-SCNC: 3.5 MMOL/L (ref 3.5–5.1)
PROT SERPL-MCNC: 8.2 G/DL (ref 6.3–8.6)
RBC # BLD AUTO: 5.07 10*6/MM3 (ref 4.37–5.74)
SODIUM BLD-SCNC: 139 MMOL/L (ref 137–145)
TROPONIN I SERPL-MCNC: <0.012 NG/ML
WBC NRBC COR # BLD: 8.6 10*3/MM3 (ref 3.2–9.8)
WHOLE BLOOD HOLD SPECIMEN: NORMAL
WHOLE BLOOD HOLD SPECIMEN: NORMAL

## 2018-09-09 PROCEDURE — 85025 COMPLETE CBC W/AUTO DIFF WBC: CPT | Performed by: EMERGENCY MEDICINE

## 2018-09-09 PROCEDURE — 96375 TX/PRO/DX INJ NEW DRUG ADDON: CPT

## 2018-09-09 PROCEDURE — 71045 X-RAY EXAM CHEST 1 VIEW: CPT

## 2018-09-09 PROCEDURE — 96374 THER/PROPH/DIAG INJ IV PUSH: CPT

## 2018-09-09 PROCEDURE — 93010 ELECTROCARDIOGRAM REPORT: CPT | Performed by: INTERNAL MEDICINE

## 2018-09-09 PROCEDURE — 84484 ASSAY OF TROPONIN QUANT: CPT | Performed by: EMERGENCY MEDICINE

## 2018-09-09 PROCEDURE — 80053 COMPREHEN METABOLIC PANEL: CPT | Performed by: EMERGENCY MEDICINE

## 2018-09-09 PROCEDURE — 93005 ELECTROCARDIOGRAM TRACING: CPT | Performed by: EMERGENCY MEDICINE

## 2018-09-09 PROCEDURE — 96361 HYDRATE IV INFUSION ADD-ON: CPT

## 2018-09-09 PROCEDURE — 93005 ELECTROCARDIOGRAM TRACING: CPT

## 2018-09-09 PROCEDURE — 25010000002 KETOROLAC TROMETHAMINE PER 15 MG: Performed by: EMERGENCY MEDICINE

## 2018-09-09 PROCEDURE — 80307 DRUG TEST PRSMV CHEM ANLYZR: CPT | Performed by: EMERGENCY MEDICINE

## 2018-09-09 PROCEDURE — 25010000002 LORAZEPAM PER 2 MG: Performed by: EMERGENCY MEDICINE

## 2018-09-09 PROCEDURE — 99284 EMERGENCY DEPT VISIT MOD MDM: CPT

## 2018-09-09 RX ORDER — SODIUM CHLORIDE 0.9 % (FLUSH) 0.9 %
10 SYRINGE (ML) INJECTION AS NEEDED
Status: DISCONTINUED | OUTPATIENT
Start: 2018-09-09 | End: 2018-09-10 | Stop reason: HOSPADM

## 2018-09-09 RX ORDER — KETOROLAC TROMETHAMINE 30 MG/ML
30 INJECTION, SOLUTION INTRAMUSCULAR; INTRAVENOUS ONCE
Status: COMPLETED | OUTPATIENT
Start: 2018-09-09 | End: 2018-09-09

## 2018-09-09 RX ORDER — LORAZEPAM 2 MG/ML
1 INJECTION INTRAMUSCULAR ONCE
Status: COMPLETED | OUTPATIENT
Start: 2018-09-09 | End: 2018-09-09

## 2018-09-09 RX ORDER — HYDROXYZINE PAMOATE 25 MG/1
50 CAPSULE ORAL 3 TIMES DAILY PRN
Qty: 21 CAPSULE | Refills: 0 | OUTPATIENT
Start: 2018-09-09 | End: 2020-06-17

## 2018-09-09 RX ADMIN — LORAZEPAM 1 MG: 2 INJECTION, SOLUTION INTRAMUSCULAR; INTRAVENOUS at 22:07

## 2018-09-09 RX ADMIN — SODIUM CHLORIDE 1000 ML: 9 INJECTION, SOLUTION INTRAVENOUS at 22:07

## 2018-09-09 RX ADMIN — KETOROLAC TROMETHAMINE 30 MG: 30 INJECTION, SOLUTION INTRAMUSCULAR; INTRAVENOUS at 22:07

## 2018-09-10 NOTE — DISCHARGE INSTRUCTIONS
Minimize strenuous activities.  Rest.  Follow up with the family medicine service in 2-3 days for recheck.  Return with any new or worsening symptoms or any concerns.
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
## 2018-11-14 ENCOUNTER — HOSPITAL ENCOUNTER (EMERGENCY)
Facility: HOSPITAL | Age: 23
Discharge: HOME | End: 2018-11-15
Attending: EMERGENCY MEDICINE | Admitting: EMERGENCY MEDICINE

## 2018-11-14 ENCOUNTER — APPOINTMENT (OUTPATIENT)
Dept: GENERAL RADIOLOGY | Facility: HOSPITAL | Age: 23
End: 2018-11-14

## 2018-11-14 DIAGNOSIS — L03.011 CELLULITIS OF RIGHT INDEX FINGER: Primary | ICD-10-CM

## 2018-11-14 PROCEDURE — 90471 IMMUNIZATION ADMIN: CPT | Performed by: EMERGENCY MEDICINE

## 2018-11-14 PROCEDURE — 73140 X-RAY EXAM OF FINGER(S): CPT

## 2018-11-14 PROCEDURE — 25010000002 KETOROLAC TROMETHAMINE PER 15 MG: Performed by: EMERGENCY MEDICINE

## 2018-11-14 PROCEDURE — 25010000002 TDAP 5-2.5-18.5 LF-MCG/0.5 SUSPENSION: Performed by: EMERGENCY MEDICINE

## 2018-11-14 PROCEDURE — 90715 TDAP VACCINE 7 YRS/> IM: CPT | Performed by: EMERGENCY MEDICINE

## 2018-11-14 PROCEDURE — 96372 THER/PROPH/DIAG INJ SC/IM: CPT

## 2018-11-14 PROCEDURE — 99283 EMERGENCY DEPT VISIT LOW MDM: CPT

## 2018-11-14 RX ORDER — KETOROLAC TROMETHAMINE 30 MG/ML
60 INJECTION, SOLUTION INTRAMUSCULAR; INTRAVENOUS ONCE
Status: COMPLETED | OUTPATIENT
Start: 2018-11-14 | End: 2018-11-14

## 2018-11-14 RX ORDER — CLINDAMYCIN PHOSPHATE 150 MG/ML
600 INJECTION, SOLUTION INTRAVENOUS ONCE
Status: COMPLETED | OUTPATIENT
Start: 2018-11-14 | End: 2018-11-14

## 2018-11-14 RX ORDER — NAPROXEN 500 MG/1
500 TABLET ORAL 2 TIMES DAILY PRN
Qty: 10 TABLET | Refills: 0 | OUTPATIENT
Start: 2018-11-14 | End: 2020-06-17

## 2018-11-14 RX ORDER — CLINDAMYCIN HYDROCHLORIDE 300 MG/1
300 CAPSULE ORAL 4 TIMES DAILY
Qty: 40 CAPSULE | Refills: 0 | Status: SHIPPED | OUTPATIENT
Start: 2018-11-14 | End: 2018-11-24

## 2018-11-14 RX ADMIN — TETANUS TOXOID, REDUCED DIPHTHERIA TOXOID AND ACELLULAR PERTUSSIS VACCINE, ADSORBED 0.5 ML: 5; 2.5; 8; 8; 2.5 SUSPENSION INTRAMUSCULAR at 23:37

## 2018-11-14 RX ADMIN — CLINDAMYCIN PHOSPHATE 600 MG: 150 INJECTION, SOLUTION INTRAMUSCULAR; INTRAVENOUS at 23:34

## 2018-11-14 RX ADMIN — KETOROLAC TROMETHAMINE 60 MG: 60 INJECTION, SOLUTION INTRAMUSCULAR at 23:32

## 2018-11-15 VITALS
HEIGHT: 72 IN | SYSTOLIC BLOOD PRESSURE: 141 MMHG | RESPIRATION RATE: 16 BRPM | WEIGHT: 164.06 LBS | DIASTOLIC BLOOD PRESSURE: 86 MMHG | HEART RATE: 122 BPM | BODY MASS INDEX: 22.22 KG/M2 | OXYGEN SATURATION: 100 % | TEMPERATURE: 97.1 F

## 2018-11-15 NOTE — ED NOTES
States 2.5 weeks ago, smashed right index finger between 25lb sledgehammer and trailer frame. States worsening swelling/pain last 2 days. Open skin noted over 1st joint. Pt states originally scabbed over, but re-opened.     Marian Floyd, RN  11/14/18 2028

## 2018-11-15 NOTE — ED NOTES
Patient presents to ED with right pointer finger injury. Patient states he smashed it was a 25 lb sledge hammer 2 weeks ago and 2 days ago he states he accidentally hit it on the wall.      Michelle Velasco RN  11/14/18 2124

## 2018-11-15 NOTE — ED PROVIDER NOTES
Subjective   23-year-old white male presents to emergency department with chief complaint of finger injury.  He injured his right index finger couple weeks ago when he hit it with a sledgehammer and then he reinjured a couple days ago.  He has not had a fever and he is feeling well otherwise.            Review of Systems   Musculoskeletal:        Right index finger pain and swelling   Skin: Positive for wound.   All other systems reviewed and are negative.      Past Medical History:   Diagnosis Date   • ADHD (attention deficit hyperactivity disorder)     as a child   • Scoliosis deformity of spine    • Torsion of testis        Allergies   Allergen Reactions   • Lemon Extract [Flavoring Agent] Anaphylaxis   • Penicillins Rash       Past Surgical History:   Procedure Laterality Date   • HERNIA REPAIR  1994    (1)    Bilateral inguinal hernia repair    • ORCHIECTOMY      Right scrotal exploration. Right detorsion. Left orchiopexy. Removal of bilateral appendage epididymis. Right orchiopexy. After removal of appendage testis and epididymis, right orchiectomy   • TESTICLE TORSION REPAIR         Family History   Problem Relation Age of Onset   • Hypertension Mother    • Diabetes Paternal Grandmother        Social History     Socioeconomic History   • Marital status: Single     Spouse name: Not on file   • Number of children: Not on file   • Years of education: Not on file   • Highest education level: Not on file   Tobacco Use   • Smoking status: Current Every Day Smoker     Packs/day: 0.50     Years: 5.00     Pack years: 2.50     Types: Cigarettes   • Smokeless tobacco: Former User     Types: Chew   Substance and Sexual Activity   • Alcohol use: Yes     Comment: occasionally   • Drug use: Yes     Types: Marijuana     Comment: occasionally   • Sexual activity: Defer           Objective   Physical Exam   Constitutional: He is oriented to person, place, and time. He appears well-developed and well-nourished. No  distress.   Musculoskeletal:   The right index finger is diffusely swollen.  On the extensor surface over the DIP joint and distal phalanx there is a tender wound with scab present.  There is no bleeding or drainage present.  There is erythema and increased warmth.  There is no sign of flexor tenosynovitis.  He has full range of motion and is neurovascularly intact distally.   Neurological: He is alert and oriented to person, place, and time. No sensory deficit. He exhibits normal muscle tone.   Skin: Skin is warm and dry. Capillary refill takes less than 2 seconds. There is erythema.   Psychiatric: He has a normal mood and affect. His behavior is normal.   Nursing note and vitals reviewed.      Procedures           ED Course  ED Course as of Nov 14 2250 Wed Nov 14, 2018 2249 I explained to the patient the importance of follow-up with orthopedics on Friday, November 16.  I will prescribe clindamycin and Naprosyn for him.  I advised him to return to the emergency department if his symptoms change or worsen.  [DR]      ED Course User Index  [DR] Campos Flores MD      Labs Reviewed - No data to display  Xr Finger 2+ View Right    Result Date: 11/14/2018  Narrative: Three view right index finger HISTORY: Pain since smashing right index finger over two weeks ago. AP, lateral and oblique views obtained. COMPARISON: None No fracture or dislocation. No other osseous or articular abnormality.     Impression: CONCLUSION: No fracture or dislocation [id number] Electronically signed by:  Cj Grubbs MD  11/14/2018 9:06 PM CST Workstation: [id number]      Final diagnoses:   Cellulitis of right index finger            Campos Flores MD  11/14/18 2927

## 2018-12-02 ENCOUNTER — APPOINTMENT (OUTPATIENT)
Dept: GENERAL RADIOLOGY | Facility: HOSPITAL | Age: 23
End: 2018-12-02

## 2018-12-02 ENCOUNTER — HOSPITAL ENCOUNTER (EMERGENCY)
Facility: HOSPITAL | Age: 23
Discharge: HOME | End: 2018-12-02
Attending: EMERGENCY MEDICINE | Admitting: EMERGENCY MEDICINE

## 2018-12-02 VITALS
OXYGEN SATURATION: 98 % | RESPIRATION RATE: 18 BRPM | HEART RATE: 104 BPM | BODY MASS INDEX: 22.48 KG/M2 | WEIGHT: 166 LBS | DIASTOLIC BLOOD PRESSURE: 64 MMHG | SYSTOLIC BLOOD PRESSURE: 131 MMHG | TEMPERATURE: 98.4 F | HEIGHT: 72 IN

## 2018-12-02 DIAGNOSIS — J20.8 ACUTE VIRAL BRONCHITIS: Primary | ICD-10-CM

## 2018-12-02 LAB
ALBUMIN SERPL-MCNC: 4.1 G/DL (ref 3.4–4.8)
ALBUMIN/GLOB SERPL: 1.2 G/DL (ref 1.1–1.8)
ALP SERPL-CCNC: 92 U/L (ref 38–126)
ALT SERPL W P-5'-P-CCNC: 38 U/L (ref 21–72)
ANION GAP SERPL CALCULATED.3IONS-SCNC: 10 MMOL/L (ref 5–15)
AST SERPL-CCNC: 36 U/L (ref 17–59)
BASOPHILS # BLD AUTO: 0.17 10*3/MM3 (ref 0–0.2)
BASOPHILS NFR BLD AUTO: 2.7 % (ref 0–2)
BILIRUB SERPL-MCNC: 0.6 MG/DL (ref 0.2–1.3)
BUN BLD-MCNC: 12 MG/DL (ref 7–21)
BUN/CREAT SERPL: 15.8 (ref 7–25)
CALCIUM SPEC-SCNC: 8.2 MG/DL (ref 8.4–10.2)
CHLORIDE SERPL-SCNC: 99 MMOL/L (ref 95–110)
CO2 SERPL-SCNC: 27 MMOL/L (ref 22–31)
CREAT BLD-MCNC: 0.76 MG/DL (ref 0.7–1.3)
DEPRECATED RDW RBC AUTO: 43.3 FL (ref 35.1–43.9)
EOSINOPHIL # BLD AUTO: 0.15 10*3/MM3 (ref 0–0.7)
EOSINOPHIL # BLD MANUAL: 0.26 10*3/MM3 (ref 0–0.7)
EOSINOPHIL NFR BLD AUTO: 2.3 % (ref 0–7)
EOSINOPHIL NFR BLD MANUAL: 4 % (ref 0–7)
ERYTHROCYTE [DISTWIDTH] IN BLOOD BY AUTOMATED COUNT: 13.9 % (ref 11.5–14.5)
FLUAV AG NPH QL: NEGATIVE
FLUBV AG NPH QL IA: NEGATIVE
GFR SERPL CREATININE-BSD FRML MDRD: 126 ML/MIN/1.73 (ref 77–179)
GLOBULIN UR ELPH-MCNC: 3.5 GM/DL (ref 2.3–3.5)
GLUCOSE BLD-MCNC: 88 MG/DL (ref 60–100)
HCT VFR BLD AUTO: 40.1 % (ref 39–49)
HGB BLD-MCNC: 13.6 G/DL (ref 13.7–17.3)
HOLD SPECIMEN: NORMAL
HOLD SPECIMEN: NORMAL
IMM GRANULOCYTES # BLD: 0.02 10*3/MM3 (ref 0–0.02)
IMM GRANULOCYTES NFR BLD: 0.3 % (ref 0–0.5)
LYMPHOCYTES # BLD AUTO: 2.59 10*3/MM3 (ref 0.6–4.2)
LYMPHOCYTES # BLD MANUAL: 1.6 10*3/MM3 (ref 0.6–4.2)
LYMPHOCYTES NFR BLD AUTO: 40.4 % (ref 10–50)
LYMPHOCYTES NFR BLD MANUAL: 25 % (ref 10–50)
LYMPHOCYTES NFR BLD MANUAL: 9 % (ref 0–12)
MCH RBC QN AUTO: 28.9 PG (ref 26.5–34)
MCHC RBC AUTO-ENTMCNC: 33.9 G/DL (ref 31.5–36.3)
MCV RBC AUTO: 85.3 FL (ref 80–98)
MONOCYTES # BLD AUTO: 0.58 10*3/MM3 (ref 0–0.9)
MONOCYTES # BLD AUTO: 0.75 10*3/MM3 (ref 0–0.9)
MONOCYTES NFR BLD AUTO: 11.7 % (ref 0–12)
NEUTROPHILS # BLD AUTO: 2.73 10*3/MM3 (ref 2–8.6)
NEUTROPHILS # BLD AUTO: 3.4 10*3/MM3 (ref 2–8.6)
NEUTROPHILS NFR BLD AUTO: 42.6 % (ref 37–80)
NEUTROPHILS NFR BLD MANUAL: 51 % (ref 37–80)
NEUTS BAND NFR BLD MANUAL: 2 % (ref 0–5)
NRBC BLD MANUAL-RTO: 0 /100 WBC (ref 0–0)
PLATELET # BLD AUTO: 228 10*3/MM3 (ref 150–450)
PMV BLD AUTO: 9.5 FL (ref 8–12)
POTASSIUM BLD-SCNC: 4.2 MMOL/L (ref 3.5–5.1)
PROT SERPL-MCNC: 7.6 G/DL (ref 6.3–8.6)
RBC # BLD AUTO: 4.7 10*6/MM3 (ref 4.37–5.74)
RBC MORPH BLD: NORMAL
SMALL PLATELETS BLD QL SMEAR: ADEQUATE
SODIUM BLD-SCNC: 136 MMOL/L (ref 137–145)
TROPONIN I SERPL-MCNC: <0.012 NG/ML
VARIANT LYMPHS NFR BLD MANUAL: 9 % (ref 0–5)
WBC MORPH BLD: NORMAL
WBC NRBC COR # BLD: 6.41 10*3/MM3 (ref 3.2–9.8)
WHOLE BLOOD HOLD SPECIMEN: NORMAL
WHOLE BLOOD HOLD SPECIMEN: NORMAL

## 2018-12-02 PROCEDURE — 80053 COMPREHEN METABOLIC PANEL: CPT | Performed by: EMERGENCY MEDICINE

## 2018-12-02 PROCEDURE — 85025 COMPLETE CBC W/AUTO DIFF WBC: CPT | Performed by: EMERGENCY MEDICINE

## 2018-12-02 PROCEDURE — 93005 ELECTROCARDIOGRAM TRACING: CPT | Performed by: EMERGENCY MEDICINE

## 2018-12-02 PROCEDURE — 99284 EMERGENCY DEPT VISIT MOD MDM: CPT

## 2018-12-02 PROCEDURE — 96374 THER/PROPH/DIAG INJ IV PUSH: CPT

## 2018-12-02 PROCEDURE — 85007 BL SMEAR W/DIFF WBC COUNT: CPT | Performed by: EMERGENCY MEDICINE

## 2018-12-02 PROCEDURE — 96375 TX/PRO/DX INJ NEW DRUG ADDON: CPT

## 2018-12-02 PROCEDURE — 25010000002 KETOROLAC TROMETHAMINE PER 15 MG: Performed by: EMERGENCY MEDICINE

## 2018-12-02 PROCEDURE — 25010000002 ONDANSETRON PER 1 MG: Performed by: EMERGENCY MEDICINE

## 2018-12-02 PROCEDURE — 94640 AIRWAY INHALATION TREATMENT: CPT

## 2018-12-02 PROCEDURE — 71046 X-RAY EXAM CHEST 2 VIEWS: CPT

## 2018-12-02 PROCEDURE — 94760 N-INVAS EAR/PLS OXIMETRY 1: CPT

## 2018-12-02 PROCEDURE — 25010000002 METHYLPREDNISOLONE PER 125 MG: Performed by: EMERGENCY MEDICINE

## 2018-12-02 PROCEDURE — 84484 ASSAY OF TROPONIN QUANT: CPT | Performed by: EMERGENCY MEDICINE

## 2018-12-02 PROCEDURE — 87804 INFLUENZA ASSAY W/OPTIC: CPT | Performed by: EMERGENCY MEDICINE

## 2018-12-02 PROCEDURE — 93010 ELECTROCARDIOGRAM REPORT: CPT | Performed by: INTERNAL MEDICINE

## 2018-12-02 RX ORDER — PREDNISONE 20 MG/1
60 TABLET ORAL DAILY
Qty: 15 TABLET | Refills: 0 | OUTPATIENT
Start: 2018-12-02 | End: 2020-06-17

## 2018-12-02 RX ORDER — ONDANSETRON 2 MG/ML
4 INJECTION INTRAMUSCULAR; INTRAVENOUS ONCE
Status: COMPLETED | OUTPATIENT
Start: 2018-12-02 | End: 2018-12-02

## 2018-12-02 RX ORDER — ALBUTEROL SULFATE 90 UG/1
2 AEROSOL, METERED RESPIRATORY (INHALATION) EVERY 6 HOURS PRN
Qty: 8 G | Refills: 0 | Status: SHIPPED | OUTPATIENT
Start: 2018-12-02

## 2018-12-02 RX ORDER — SODIUM CHLORIDE 0.9 % (FLUSH) 0.9 %
10 SYRINGE (ML) INJECTION AS NEEDED
Status: DISCONTINUED | OUTPATIENT
Start: 2018-12-02 | End: 2018-12-02 | Stop reason: HOSPADM

## 2018-12-02 RX ORDER — METHYLPREDNISOLONE SODIUM SUCCINATE 125 MG/2ML
125 INJECTION, POWDER, LYOPHILIZED, FOR SOLUTION INTRAMUSCULAR; INTRAVENOUS ONCE
Status: COMPLETED | OUTPATIENT
Start: 2018-12-02 | End: 2018-12-02

## 2018-12-02 RX ORDER — IPRATROPIUM BROMIDE AND ALBUTEROL SULFATE 2.5; .5 MG/3ML; MG/3ML
3 SOLUTION RESPIRATORY (INHALATION) ONCE
Status: COMPLETED | OUTPATIENT
Start: 2018-12-02 | End: 2018-12-02

## 2018-12-02 RX ORDER — KETOROLAC TROMETHAMINE 30 MG/ML
30 INJECTION, SOLUTION INTRAMUSCULAR; INTRAVENOUS ONCE
Status: COMPLETED | OUTPATIENT
Start: 2018-12-02 | End: 2018-12-02

## 2018-12-02 RX ADMIN — KETOROLAC TROMETHAMINE 30 MG: 30 INJECTION, SOLUTION INTRAMUSCULAR at 15:18

## 2018-12-02 RX ADMIN — IPRATROPIUM BROMIDE AND ALBUTEROL SULFATE 3 ML: .5; 3 SOLUTION RESPIRATORY (INHALATION) at 14:24

## 2018-12-02 RX ADMIN — SODIUM CHLORIDE, POTASSIUM CHLORIDE, SODIUM LACTATE AND CALCIUM CHLORIDE 1000 ML: 600; 310; 30; 20 INJECTION, SOLUTION INTRAVENOUS at 15:17

## 2018-12-02 RX ADMIN — METHYLPREDNISOLONE SODIUM SUCCINATE 125 MG: 125 INJECTION, POWDER, FOR SOLUTION INTRAMUSCULAR; INTRAVENOUS at 15:18

## 2018-12-02 RX ADMIN — ONDANSETRON 4 MG: 2 INJECTION INTRAMUSCULAR; INTRAVENOUS at 15:37

## 2018-12-02 NOTE — DISCHARGE INSTRUCTIONS
Do not smoke.  Continue with steroid and inhaler.  Follow-up with primary care physician for reevaluation.  Return to ER for worsening.

## 2018-12-02 NOTE — ED PROVIDER NOTES
Subjective   23 years old presented in the ER with a chief complaint of cough for the last 3 days which is progressively worsening.  Initially it was reactive of clear sputum and now it is more of a dry cough.  He is also having chest pain because of repeated coughing.  Having some irritation in her throat and some pain in voice.  No shortness of breath otherwise.        History provided by:  Patient  Cough   Cough characteristics:  Dry  Severity:  Moderate  Onset quality:  Gradual  Duration:  3 days  Timing:  Constant  Progression:  Worsening  Chronicity:  New  Smoker: yes    Relieved by:  Nothing  Worsened by:  Nothing  Ineffective treatments:  Decongestant  Associated symptoms: chest pain, myalgias, shortness of breath and sinus congestion    Associated symptoms: no chills and no wheezing        Review of Systems   Constitutional: Negative for chills.   HENT: Negative for congestion, sinus pressure and sinus pain.    Eyes: Negative for pain.   Respiratory: Positive for cough, chest tightness and shortness of breath. Negative for wheezing.    Cardiovascular: Positive for chest pain.   Gastrointestinal: Negative for abdominal pain, nausea and vomiting.   Genitourinary: Negative for flank pain.   Musculoskeletal: Positive for myalgias. Negative for back pain.   Skin: Negative for color change.   Neurological: Negative for speech difficulty and light-headedness.   Psychiatric/Behavioral: Negative for agitation.       Past Medical History:   Diagnosis Date   • ADHD (attention deficit hyperactivity disorder)     as a child   • Scoliosis deformity of spine    • Torsion of testis        Allergies   Allergen Reactions   • Lemon Extract [Flavoring Agent] Anaphylaxis   • Penicillins Rash       Past Surgical History:   Procedure Laterality Date   • HERNIA REPAIR  1994    (1)    Bilateral inguinal hernia repair    • ORCHIECTOMY      Right scrotal exploration. Right detorsion. Left orchiopexy. Removal of bilateral  appendage epididymis. Right orchiopexy. After removal of appendage testis and epididymis, right orchiectomy   • TESTICLE TORSION REPAIR         Family History   Problem Relation Age of Onset   • Hypertension Mother    • Diabetes Paternal Grandmother        Social History     Socioeconomic History   • Marital status: Single     Spouse name: Not on file   • Number of children: Not on file   • Years of education: Not on file   • Highest education level: Not on file   Tobacco Use   • Smoking status: Current Every Day Smoker     Packs/day: 0.50     Years: 5.00     Pack years: 2.50     Types: Cigarettes   • Smokeless tobacco: Former User     Types: Chew   Substance and Sexual Activity   • Alcohol use: Yes     Comment: occasionally   • Drug use: Yes     Types: Marijuana     Comment: occasionally   • Sexual activity: Defer           Objective   Physical Exam   Constitutional: He is oriented to person, place, and time. He appears well-developed and well-nourished.   HENT:   Head: Normocephalic and atraumatic.   Eyes: EOM are normal. Pupils are equal, round, and reactive to light.   Neck: Normal range of motion. Neck supple.   Cardiovascular: Normal rate and regular rhythm.   Pulmonary/Chest: Effort normal and breath sounds normal.   Abdominal: Soft. Bowel sounds are normal.   Musculoskeletal: Normal range of motion.   Neurological: He is alert and oriented to person, place, and time.   Skin: Skin is warm and dry. Capillary refill takes less than 2 seconds.   Psychiatric: He has a normal mood and affect. His behavior is normal.   Nursing note and vitals reviewed.      ECG 12 Lead    Date/Time: 12/2/2018 3:03 PM  Performed by: Kenneth Kohli MD  Authorized by: Kenneth Kohli MD   Interpreted by physician  Rhythm: sinus rhythm  Rate: normal  BPM: 96  QRS axis: normal  Conduction: conduction normal  ST Segments: ST segments normal  T Waves: T waves normal  Clinical impression: normal ECG                 ED Course                   MDM  Number of Diagnoses or Management Options  Diagnosis management comments: 23 years old is evaluated in the ER for coughing and chest discomfort.  Negative workup in the ER including EKG and troponin.  I think is more of infectious etiology and not cardiac in origin.  He is given DuoNeb and Solu-Medrol in here and on reevaluation feeling better. Will  start him on steroids and inhaler to go home as I think patient has while bronchitis.  Follow up outpatient.       Amount and/or Complexity of Data Reviewed  Clinical lab tests: ordered and reviewed  Tests in the radiology section of CPT®: ordered and reviewed      Labs Reviewed   COMPREHENSIVE METABOLIC PANEL - Abnormal; Notable for the following components:       Result Value    Sodium 136 (*)     Calcium 8.2 (*)     All other components within normal limits   CBC WITH AUTO DIFFERENTIAL - Abnormal; Notable for the following components:    Hemoglobin 13.6 (*)     Basophil % 2.7 (*)     All other components within normal limits   MANUAL DIFFERENTIAL - Abnormal; Notable for the following components:    Atypical Lymphocyte % 9.0 (*)     All other components within normal limits   INFLUENZA ANTIGEN, RAPID - Normal   TROPONIN (IN-HOUSE) - Normal   RAINBOW DRAW    Narrative:     The following orders were created for panel order [place] Draw.  Procedure                               Abnormality         Status                     ---------                               -----------         ------                     Light Blue Top[[id number]]                                   Final result               Green Top (Gel)[[id number]]                                  Final result               Lavender Top[[id number]]                                     Final result               Gold Top - SST[[id number]]                                   Final result                 Please view results for these tests on the individual orders.   CBC AND DIFFERENTIAL    Narrative:     The  following orders were created for panel order CBC & Differential.  Procedure                               Abnormality         Status                     ---------                               -----------         ------                     Manual Differential[[id number]]          Abnormal            Final result               Scan Slide[[id number]]                                                                  CBC Auto Differential[[id number]]        Abnormal            Final result                 Please view results for these tests on the individual orders.   LIGHT BLUE TOP   GREEN TOP   LAVENDER TOP   GOLD TOP - SST       Xr Chest 2 View    Result Date: 12/2/2018  Narrative: TWO VIEW CHEST HISTORY: Chest pain. Shortness of breath. Frontal and lateral films of the chest were obtained. COMPARISON: September 9, 2018 The lungs are clear of an acute process. The heart is not enlarged. The pulmonary vasculature is not increased. No pleural effusion. No pneumothorax. No acute osseous abnormality.     Impression: CONCLUSION: Normal chest [id number] Electronically signed by:  Cj Grubbs MD  12/2/2018 12:33 PM CST Workstation: [id number]    Xr Finger 2+ View Right    Result Date: 11/14/2018  Narrative: Three view right index finger HISTORY: Pain since smashing right index finger over two weeks ago. AP, lateral and oblique views obtained. COMPARISON: None No fracture or dislocation. No other osseous or articular abnormality.     Impression: CONCLUSION: No fracture or dislocation [id number] Electronically signed by:  Cj Grubbs MD  11/14/2018 9:06 PM CST Workstation: [id number]          Final diagnoses:   Acute viral bronchitis            Kenneth Kohli MD  12/02/18 1536

## 2019-07-02 ENCOUNTER — HOSPITAL ENCOUNTER (EMERGENCY)
Facility: HOSPITAL | Age: 24
Discharge: HOME | End: 2019-07-02
Attending: FAMILY MEDICINE | Admitting: FAMILY MEDICINE

## 2019-07-02 VITALS
SYSTOLIC BLOOD PRESSURE: 112 MMHG | WEIGHT: 171.4 LBS | DIASTOLIC BLOOD PRESSURE: 64 MMHG | OXYGEN SATURATION: 99 % | HEART RATE: 43 BPM | HEIGHT: 72 IN | BODY MASS INDEX: 23.22 KG/M2 | RESPIRATION RATE: 18 BRPM | TEMPERATURE: 97.8 F

## 2019-07-02 DIAGNOSIS — R10.33 PERIUMBILICAL ABDOMINAL PAIN: ICD-10-CM

## 2019-07-02 DIAGNOSIS — R11.2 NON-INTRACTABLE VOMITING WITH NAUSEA, UNSPECIFIED VOMITING TYPE: Primary | ICD-10-CM

## 2019-07-02 LAB
ALBUMIN SERPL-MCNC: 4.2 G/DL (ref 3.5–5.2)
ALBUMIN/GLOB SERPL: 1.4 G/DL
ALP SERPL-CCNC: 49 U/L (ref 39–117)
ALT SERPL W P-5'-P-CCNC: 18 U/L (ref 1–41)
ANION GAP SERPL CALCULATED.3IONS-SCNC: 10 MMOL/L (ref 5–15)
AST SERPL-CCNC: 15 U/L (ref 1–40)
BASOPHILS # BLD AUTO: 0.07 10*3/MM3 (ref 0–0.2)
BASOPHILS NFR BLD AUTO: 0.6 % (ref 0–1.5)
BILIRUB SERPL-MCNC: 0.2 MG/DL (ref 0.2–1.2)
BILIRUB UR QL STRIP: NEGATIVE
BUN BLD-MCNC: 11 MG/DL (ref 6–20)
BUN/CREAT SERPL: 12.9 (ref 7–25)
CALCIUM SPEC-SCNC: 9.1 MG/DL (ref 8.6–10.5)
CHLORIDE SERPL-SCNC: 103 MMOL/L (ref 98–107)
CLARITY UR: ABNORMAL
CO2 SERPL-SCNC: 30 MMOL/L (ref 22–29)
COLOR UR: YELLOW
CREAT BLD-MCNC: 0.85 MG/DL (ref 0.76–1.27)
DEPRECATED RDW RBC AUTO: 40.4 FL (ref 37–54)
EOSINOPHIL # BLD AUTO: 0.11 10*3/MM3 (ref 0–0.4)
EOSINOPHIL NFR BLD AUTO: 1 % (ref 0.3–6.2)
ERYTHROCYTE [DISTWIDTH] IN BLOOD BY AUTOMATED COUNT: 13.2 % (ref 12.3–15.4)
GFR SERPL CREATININE-BSD FRML MDRD: 110 ML/MIN/1.73
GLOBULIN UR ELPH-MCNC: 2.9 GM/DL
GLUCOSE BLD-MCNC: 121 MG/DL (ref 65–99)
GLUCOSE UR STRIP-MCNC: NEGATIVE MG/DL
HCT VFR BLD AUTO: 42.6 % (ref 37.5–51)
HGB BLD-MCNC: 14.4 G/DL (ref 13–17.7)
HGB UR QL STRIP.AUTO: NEGATIVE
HOLD SPECIMEN: NORMAL
HOLD SPECIMEN: NORMAL
IMM GRANULOCYTES # BLD AUTO: 0.02 10*3/MM3 (ref 0–0.05)
IMM GRANULOCYTES NFR BLD AUTO: 0.2 % (ref 0–0.5)
KETONES UR QL STRIP: NEGATIVE
LEUKOCYTE ESTERASE UR QL STRIP.AUTO: NEGATIVE
LIPASE SERPL-CCNC: 21 U/L (ref 13–60)
LYMPHOCYTES # BLD AUTO: 2.35 10*3/MM3 (ref 0.7–3.1)
LYMPHOCYTES NFR BLD AUTO: 20.7 % (ref 19.6–45.3)
MCH RBC QN AUTO: 28.5 PG (ref 26.6–33)
MCHC RBC AUTO-ENTMCNC: 33.8 G/DL (ref 31.5–35.7)
MCV RBC AUTO: 84.2 FL (ref 79–97)
MONOCYTES # BLD AUTO: 0.96 10*3/MM3 (ref 0.1–0.9)
MONOCYTES NFR BLD AUTO: 8.5 % (ref 5–12)
NEUTROPHILS # BLD AUTO: 7.84 10*3/MM3 (ref 1.7–7)
NEUTROPHILS NFR BLD AUTO: 69 % (ref 42.7–76)
NITRITE UR QL STRIP: NEGATIVE
NRBC BLD AUTO-RTO: 0 /100 WBC (ref 0–0.2)
PH UR STRIP.AUTO: 7.5 [PH] (ref 5–9)
PLATELET # BLD AUTO: 297 10*3/MM3 (ref 140–450)
PMV BLD AUTO: 10.3 FL (ref 6–12)
POTASSIUM BLD-SCNC: 3.6 MMOL/L (ref 3.5–5.2)
PROT SERPL-MCNC: 7.1 G/DL (ref 6–8.5)
PROT UR QL STRIP: NEGATIVE
RBC # BLD AUTO: 5.06 10*6/MM3 (ref 4.14–5.8)
SODIUM BLD-SCNC: 143 MMOL/L (ref 136–145)
SP GR UR STRIP: 1.02 (ref 1–1.03)
UROBILINOGEN UR QL STRIP: ABNORMAL
WBC NRBC COR # BLD: 11.35 10*3/MM3 (ref 3.4–10.8)
WHOLE BLOOD HOLD SPECIMEN: NORMAL
WHOLE BLOOD HOLD SPECIMEN: NORMAL

## 2019-07-02 PROCEDURE — 96374 THER/PROPH/DIAG INJ IV PUSH: CPT

## 2019-07-02 PROCEDURE — 83690 ASSAY OF LIPASE: CPT

## 2019-07-02 PROCEDURE — 81003 URINALYSIS AUTO W/O SCOPE: CPT | Performed by: FAMILY MEDICINE

## 2019-07-02 PROCEDURE — 99284 EMERGENCY DEPT VISIT MOD MDM: CPT

## 2019-07-02 PROCEDURE — 85025 COMPLETE CBC W/AUTO DIFF WBC: CPT

## 2019-07-02 PROCEDURE — 25010000002 PROMETHAZINE PER 50 MG: Performed by: FAMILY MEDICINE

## 2019-07-02 PROCEDURE — 80053 COMPREHEN METABOLIC PANEL: CPT

## 2019-07-02 RX ORDER — SODIUM CHLORIDE 0.9 % (FLUSH) 0.9 %
10 SYRINGE (ML) INJECTION AS NEEDED
Status: DISCONTINUED | OUTPATIENT
Start: 2019-07-02 | End: 2019-07-02 | Stop reason: HOSPADM

## 2019-07-02 RX ORDER — PROMETHAZINE HYDROCHLORIDE 25 MG/1
25 SUPPOSITORY RECTAL EVERY 6 HOURS PRN
Qty: 15 SUPPOSITORY | Refills: 0 | OUTPATIENT
Start: 2019-07-02 | End: 2020-06-17

## 2019-07-02 RX ORDER — DICYCLOMINE HCL 20 MG
20 TABLET ORAL EVERY 6 HOURS PRN
Qty: 30 TABLET | Refills: 0 | Status: SHIPPED | OUTPATIENT
Start: 2019-07-02 | End: 2020-06-17 | Stop reason: SDUPTHER

## 2019-07-02 RX ORDER — PROMETHAZINE HYDROCHLORIDE 25 MG/1
25 TABLET ORAL EVERY 6 HOURS PRN
Qty: 15 TABLET | Refills: 0 | OUTPATIENT
Start: 2019-07-02 | End: 2020-06-17

## 2019-07-02 RX ORDER — PROMETHAZINE HYDROCHLORIDE 25 MG/ML
12.5 INJECTION, SOLUTION INTRAMUSCULAR; INTRAVENOUS ONCE
Status: COMPLETED | OUTPATIENT
Start: 2019-07-02 | End: 2019-07-02

## 2019-07-02 RX ORDER — HYDROCODONE BITARTRATE AND ACETAMINOPHEN 7.5; 325 MG/1; MG/1
1 TABLET ORAL ONCE
Status: COMPLETED | OUTPATIENT
Start: 2019-07-02 | End: 2019-07-02

## 2019-07-02 RX ADMIN — PROMETHAZINE HYDROCHLORIDE 12.5 MG: 25 INJECTION INTRAMUSCULAR; INTRAVENOUS at 13:22

## 2019-07-02 RX ADMIN — HYDROCODONE BITARTRATE AND ACETAMINOPHEN 1 TABLET: 7.5; 325 TABLET ORAL at 13:21

## 2019-07-02 NOTE — ED PROVIDER NOTES
Subjective     Abdominal Pain   Pain location:  Periumbilical and epigastric  Pain quality: dull    Pain radiates to:  Back  Pain severity:  Moderate  Onset quality:  Sudden  Duration:  6 hours  Timing:  Intermittent  Progression:  Waxing and waning  Chronicity:  New  Context: not alcohol use, not sick contacts, not suspicious food intake and not trauma    Relieved by:  Nothing  Worsened by:  Nothing  Ineffective treatments:  None tried  Associated symptoms: nausea and vomiting    Associated symptoms: no chest pain, no chills, no constipation, no cough, no diarrhea, no dysuria, no fatigue, no fever, no shortness of breath and no sore throat    Risk factors: not obese and no recent hospitalization    Vomiting   The primary symptoms include abdominal pain, nausea and vomiting. Primary symptoms do not include fever, fatigue, diarrhea, dysuria, myalgias or rash.   The illness does not include chills or constipation.       Review of Systems   Constitutional: Negative for appetite change, chills, diaphoresis, fatigue and fever.   HENT: Negative for congestion, ear discharge, ear pain, nosebleeds, rhinorrhea, sinus pressure, sore throat and trouble swallowing.    Eyes: Negative for discharge and redness.   Respiratory: Negative for apnea, cough, chest tightness, shortness of breath and wheezing.    Cardiovascular: Negative for chest pain.   Gastrointestinal: Positive for abdominal pain, nausea and vomiting. Negative for constipation and diarrhea.   Endocrine: Negative for polyuria.   Genitourinary: Negative for dysuria, frequency and urgency.   Musculoskeletal: Negative for myalgias and neck pain.   Skin: Negative for color change and rash.   Allergic/Immunologic: Negative for immunocompromised state.   Neurological: Negative for dizziness, seizures, syncope, weakness, light-headedness and headaches.   Hematological: Negative for adenopathy. Does not bruise/bleed easily.   Psychiatric/Behavioral: Negative for behavioral  problems and confusion.   All other systems reviewed and are negative.      Past Medical History:   Diagnosis Date   • ADHD (attention deficit hyperactivity disorder)     as a child   • Scoliosis deformity of spine    • Torsion of testis        Allergies   Allergen Reactions   • Lemon Extract [Flavoring Agent] Anaphylaxis   • Penicillins Rash       Past Surgical History:   Procedure Laterality Date   • CHOLECYSTECTOMY N/A 3/30/2018    Procedure: SUBTOTAL CHOLECYSTECTOMY LAPAROSCOPIC;  Surgeon: Ras Griggs MD;  Location: [facility];  Service: General   • HERNIA REPAIR  1994    (1)    Bilateral inguinal hernia repair    • ORCHIECTOMY      Right scrotal exploration. Right detorsion. Left orchiopexy. Removal of bilateral appendage epididymis. Right orchiopexy. After removal of appendage testis and epididymis, right orchiectomy   • TESTICLE TORSION REPAIR         Family History   Problem Relation Age of Onset   • Hypertension Mother    • Diabetes Paternal Grandmother        Social History     Socioeconomic History   • Marital status: Single     Spouse name: Not on file   • Number of children: Not on file   • Years of education: Not on file   • Highest education level: Not on file   Tobacco Use   • Smoking status: Current Every Day Smoker     Packs/day: 0.50     Years: 5.00     Pack years: 2.50     Types: Cigarettes   • Smokeless tobacco: Former User     Types: Chew   Substance and Sexual Activity   • Alcohol use: No     Frequency: Never     Comment: occasionally   • Drug use: Yes     Types: Marijuana     Comment: occasionally   • Sexual activity: Defer           Objective   Physical Exam   Constitutional: He is oriented to person, place, and time. He appears well-developed and well-nourished.   HENT:   Head: Normocephalic and atraumatic.   Nose: Nose normal.   Mouth/Throat: Oropharynx is clear and moist.   Eyes: Conjunctivae and EOM are normal. Pupils are equal, round, and reactive to light. Right eye exhibits  no discharge. Left eye exhibits no discharge. No scleral icterus.   Neck: Normal range of motion. Neck supple. No tracheal deviation present.   Cardiovascular: Normal rate, regular rhythm and normal heart sounds.   No murmur heard.  Pulmonary/Chest: Effort normal and breath sounds normal. No stridor. No respiratory distress. He has no wheezes. He has no rales.   Abdominal: Soft. Bowel sounds are normal. He exhibits no distension and no mass. There is no tenderness. There is no rebound and no guarding.   Musculoskeletal: He exhibits no edema.   Neurological: He is alert and oriented to person, place, and time. Coordination normal.   Skin: Skin is warm and dry. No rash noted. No erythema.   Psychiatric: He has a normal mood and affect. His behavior is normal. Thought content normal.   Nursing note and vitals reviewed.      Procedures           ED Course          Labs Reviewed   COMPREHENSIVE METABOLIC PANEL - Abnormal; Notable for the following components:       Result Value    Glucose 121 (*)     CO2 30.0 (*)     All other components within normal limits    Narrative:     GFR Normal >60  Chronic Kidney Disease <60  Kidney Failure <15   URINALYSIS W/ MICROSCOPIC IF INDICATED (NO CULTURE) - Abnormal; Notable for the following components:    Appearance, UA Turbid (*)     All other components within normal limits    Narrative:     Urine microscopic not indicated.   CBC WITH AUTO DIFFERENTIAL - Abnormal; Notable for the following components:    WBC 11.35 (*)     Neutrophils, Absolute 7.84 (*)     Monocytes, Absolute 0.96 (*)     All other components within normal limits   LIPASE - Normal   RAINBOW DRAW    Narrative:     The following orders were created for panel order [place] Draw.  Procedure                               Abnormality         Status                     ---------                               -----------         ------                     Light Blue Top[[id number]]                                   Final  result               Green Top (Gel)[[id number]]                                  Final result               Lavender Top[[id number]]                                     Final result               Gold Top - SST[[id number]]                                   Final result                 Please view results for these tests on the individual orders.   CBC AND DIFFERENTIAL    Narrative:     The following orders were created for panel order CBC & Differential.  Procedure                               Abnormality         Status                     ---------                               -----------         ------                     CBC Auto Differential[[id number]]        Abnormal            Final result                 Please view results for these tests on the individual orders.   LIGHT BLUE TOP   GREEN TOP   LAVENDER TOP   GOLD TOP - SST       No orders to display                 MDM      Final diagnoses:   Non-intractable vomiting with nausea, unspecified vomiting type   Periumbilical abdominal pain            Mikey Dos Santos MD  07/02/19 1326       Mikey Dos Santos MD  07/02/19 1325

## 2019-07-02 NOTE — ED NOTES
Urinal given to patient and asked to provide a urine sample.      Margareth Raman, RN  07/02/19 4555

## 2020-04-16 ENCOUNTER — APPOINTMENT (OUTPATIENT)
Dept: CT IMAGING | Facility: HOSPITAL | Age: 25
End: 2020-04-16

## 2020-04-16 ENCOUNTER — HOSPITAL ENCOUNTER (EMERGENCY)
Facility: HOSPITAL | Age: 25
Discharge: HOME | End: 2020-04-16
Attending: EMERGENCY MEDICINE | Admitting: EMERGENCY MEDICINE

## 2020-04-16 VITALS
HEART RATE: 54 BPM | BODY MASS INDEX: 21.14 KG/M2 | TEMPERATURE: 97.7 F | HEIGHT: 72 IN | DIASTOLIC BLOOD PRESSURE: 67 MMHG | WEIGHT: 156.1 LBS | RESPIRATION RATE: 16 BRPM | OXYGEN SATURATION: 100 % | SYSTOLIC BLOOD PRESSURE: 115 MMHG

## 2020-04-16 DIAGNOSIS — R10.31 RIGHT LOWER QUADRANT ABDOMINAL PAIN: Primary | ICD-10-CM

## 2020-04-16 DIAGNOSIS — R11.2 NAUSEA AND VOMITING, INTRACTABILITY OF VOMITING NOT SPECIFIED, UNSPECIFIED VOMITING TYPE: ICD-10-CM

## 2020-04-16 LAB
ALBUMIN SERPL-MCNC: 4.1 G/DL (ref 3.5–5.2)
ALBUMIN/GLOB SERPL: 1.1 G/DL
ALP SERPL-CCNC: 70 U/L (ref 39–117)
ALT SERPL W P-5'-P-CCNC: 20 U/L (ref 1–41)
AMPHET+METHAMPHET UR QL: POSITIVE
AMPHETAMINES UR QL: POSITIVE
ANION GAP SERPL CALCULATED.3IONS-SCNC: 13 MMOL/L (ref 5–15)
AST SERPL-CCNC: 16 U/L (ref 1–40)
BACTERIA UR QL AUTO: NORMAL /HPF
BARBITURATES UR QL SCN: NEGATIVE
BASOPHILS # BLD AUTO: 0.08 10*3/MM3 (ref 0–0.2)
BASOPHILS NFR BLD AUTO: 0.7 % (ref 0–1.5)
BENZODIAZ UR QL SCN: NEGATIVE
BILIRUB SERPL-MCNC: 0.3 MG/DL (ref 0.2–1.2)
BILIRUB UR QL STRIP: NEGATIVE
BUN BLD-MCNC: 9 MG/DL (ref 6–20)
BUN/CREAT SERPL: 11.1 (ref 7–25)
BUPRENORPHINE SERPL-MCNC: NEGATIVE NG/ML
CALCIUM SPEC-SCNC: 9.8 MG/DL (ref 8.6–10.5)
CANNABINOIDS SERPL QL: POSITIVE
CHLORIDE SERPL-SCNC: 99 MMOL/L (ref 98–107)
CLARITY UR: ABNORMAL
CO2 SERPL-SCNC: 28 MMOL/L (ref 22–29)
COCAINE UR QL: NEGATIVE
COLOR UR: YELLOW
CREAT BLD-MCNC: 0.81 MG/DL (ref 0.76–1.27)
DEPRECATED RDW RBC AUTO: 40.5 FL (ref 37–54)
EOSINOPHIL # BLD AUTO: 0.11 10*3/MM3 (ref 0–0.4)
EOSINOPHIL NFR BLD AUTO: 1 % (ref 0.3–6.2)
ERYTHROCYTE [DISTWIDTH] IN BLOOD BY AUTOMATED COUNT: 13.2 % (ref 12.3–15.4)
ETHANOL BLD-MCNC: <10 MG/DL (ref 0–10)
ETHANOL UR QL: <0.01 %
GFR SERPL CREATININE-BSD FRML MDRD: 116 ML/MIN/1.73
GLOBULIN UR ELPH-MCNC: 3.8 GM/DL
GLUCOSE BLD-MCNC: 122 MG/DL (ref 65–99)
GLUCOSE BLDC GLUCOMTR-MCNC: 97 MG/DL (ref 70–130)
GLUCOSE UR STRIP-MCNC: NEGATIVE MG/DL
HCT VFR BLD AUTO: 42.4 % (ref 37.5–51)
HGB BLD-MCNC: 14.3 G/DL (ref 13–17.7)
HGB UR QL STRIP.AUTO: NEGATIVE
HOLD SPECIMEN: NORMAL
HOLD SPECIMEN: NORMAL
HYALINE CASTS UR QL AUTO: NORMAL /LPF
IMM GRANULOCYTES # BLD AUTO: 0.04 10*3/MM3 (ref 0–0.05)
IMM GRANULOCYTES NFR BLD AUTO: 0.4 % (ref 0–0.5)
KETONES UR QL STRIP: ABNORMAL
LEUKOCYTE ESTERASE UR QL STRIP.AUTO: ABNORMAL
LIPASE SERPL-CCNC: 16 U/L (ref 13–60)
LYMPHOCYTES # BLD AUTO: 1.86 10*3/MM3 (ref 0.7–3.1)
LYMPHOCYTES NFR BLD AUTO: 17.1 % (ref 19.6–45.3)
MAGNESIUM SERPL-MCNC: 2.3 MG/DL (ref 1.6–2.6)
MCH RBC QN AUTO: 28.5 PG (ref 26.6–33)
MCHC RBC AUTO-ENTMCNC: 33.7 G/DL (ref 31.5–35.7)
MCV RBC AUTO: 84.5 FL (ref 79–97)
METHADONE UR QL SCN: NEGATIVE
MONOCYTES # BLD AUTO: 0.51 10*3/MM3 (ref 0.1–0.9)
MONOCYTES NFR BLD AUTO: 4.7 % (ref 5–12)
NEUTROPHILS # BLD AUTO: 8.26 10*3/MM3 (ref 1.7–7)
NEUTROPHILS NFR BLD AUTO: 76.1 % (ref 42.7–76)
NITRITE UR QL STRIP: NEGATIVE
NRBC BLD AUTO-RTO: 0 /100 WBC (ref 0–0.2)
OPIATES UR QL: NEGATIVE
OXYCODONE UR QL SCN: NEGATIVE
PCP UR QL SCN: NEGATIVE
PH UR STRIP.AUTO: 8.5 [PH] (ref 5–9)
PLATELET # BLD AUTO: 479 10*3/MM3 (ref 140–450)
PMV BLD AUTO: 9.8 FL (ref 6–12)
POTASSIUM BLD-SCNC: 4.4 MMOL/L (ref 3.5–5.2)
PROPOXYPH UR QL: NEGATIVE
PROT SERPL-MCNC: 7.9 G/DL (ref 6–8.5)
PROT UR QL STRIP: NEGATIVE
RBC # BLD AUTO: 5.02 10*6/MM3 (ref 4.14–5.8)
RBC # UR: NORMAL /HPF
REF LAB TEST METHOD: NORMAL
SODIUM BLD-SCNC: 140 MMOL/L (ref 136–145)
SP GR UR STRIP: 1.02 (ref 1–1.03)
SQUAMOUS #/AREA URNS HPF: NORMAL /HPF
TRICYCLICS UR QL SCN: NEGATIVE
UROBILINOGEN UR QL STRIP: ABNORMAL
WBC NRBC COR # BLD: 10.86 10*3/MM3 (ref 3.4–10.8)
WBC UR QL AUTO: NORMAL /HPF
WHOLE BLOOD HOLD SPECIMEN: NORMAL
WHOLE BLOOD HOLD SPECIMEN: NORMAL

## 2020-04-16 PROCEDURE — 85025 COMPLETE CBC W/AUTO DIFF WBC: CPT | Performed by: EMERGENCY MEDICINE

## 2020-04-16 PROCEDURE — 96375 TX/PRO/DX INJ NEW DRUG ADDON: CPT

## 2020-04-16 PROCEDURE — 25010000002 ONDANSETRON PER 1 MG: Performed by: EMERGENCY MEDICINE

## 2020-04-16 PROCEDURE — 96361 HYDRATE IV INFUSION ADD-ON: CPT

## 2020-04-16 PROCEDURE — 81001 URINALYSIS AUTO W/SCOPE: CPT | Performed by: EMERGENCY MEDICINE

## 2020-04-16 PROCEDURE — 80307 DRUG TEST PRSMV CHEM ANLYZR: CPT | Performed by: EMERGENCY MEDICINE

## 2020-04-16 PROCEDURE — 74177 CT ABD & PELVIS W/CONTRAST: CPT

## 2020-04-16 PROCEDURE — 25010000002 KETOROLAC TROMETHAMINE PER 15 MG: Performed by: EMERGENCY MEDICINE

## 2020-04-16 PROCEDURE — 82962 GLUCOSE BLOOD TEST: CPT

## 2020-04-16 PROCEDURE — 80053 COMPREHEN METABOLIC PANEL: CPT | Performed by: EMERGENCY MEDICINE

## 2020-04-16 PROCEDURE — 99284 EMERGENCY DEPT VISIT MOD MDM: CPT

## 2020-04-16 PROCEDURE — 93010 ELECTROCARDIOGRAM REPORT: CPT | Performed by: INTERNAL MEDICINE

## 2020-04-16 PROCEDURE — 83690 ASSAY OF LIPASE: CPT | Performed by: EMERGENCY MEDICINE

## 2020-04-16 PROCEDURE — 36415 COLL VENOUS BLD VENIPUNCTURE: CPT

## 2020-04-16 PROCEDURE — 25010000002 IOPAMIDOL 61 % SOLUTION: Performed by: EMERGENCY MEDICINE

## 2020-04-16 PROCEDURE — 96374 THER/PROPH/DIAG INJ IV PUSH: CPT

## 2020-04-16 PROCEDURE — 93005 ELECTROCARDIOGRAM TRACING: CPT | Performed by: EMERGENCY MEDICINE

## 2020-04-16 PROCEDURE — 83735 ASSAY OF MAGNESIUM: CPT | Performed by: EMERGENCY MEDICINE

## 2020-04-16 PROCEDURE — 0 DIATRIZOATE MEGLUMINE & SODIUM PER 1 ML: Performed by: EMERGENCY MEDICINE

## 2020-04-16 RX ORDER — POLYETHYLENE GLYCOL 3350 17 G/17G
17 POWDER, FOR SOLUTION ORAL DAILY PRN
Qty: 507 G | Refills: 0 | OUTPATIENT
Start: 2020-04-16 | End: 2020-06-17

## 2020-04-16 RX ORDER — ONDANSETRON 4 MG/1
4 TABLET, ORALLY DISINTEGRATING ORAL EVERY 6 HOURS PRN
Qty: 15 TABLET | Refills: 0 | OUTPATIENT
Start: 2020-04-16 | End: 2020-06-17

## 2020-04-16 RX ORDER — ONDANSETRON 2 MG/ML
4 INJECTION INTRAMUSCULAR; INTRAVENOUS ONCE
Status: COMPLETED | OUTPATIENT
Start: 2020-04-16 | End: 2020-04-16

## 2020-04-16 RX ORDER — KETOROLAC TROMETHAMINE 30 MG/ML
30 INJECTION, SOLUTION INTRAMUSCULAR; INTRAVENOUS ONCE
Status: COMPLETED | OUTPATIENT
Start: 2020-04-16 | End: 2020-04-16

## 2020-04-16 RX ORDER — SODIUM CHLORIDE 9 MG/ML
125 INJECTION, SOLUTION INTRAVENOUS CONTINUOUS
Status: DISCONTINUED | OUTPATIENT
Start: 2020-04-16 | End: 2020-04-16 | Stop reason: HOSPADM

## 2020-04-16 RX ORDER — SODIUM CHLORIDE 0.9 % (FLUSH) 0.9 %
10 SYRINGE (ML) INJECTION AS NEEDED
Status: DISCONTINUED | OUTPATIENT
Start: 2020-04-16 | End: 2020-04-16 | Stop reason: HOSPADM

## 2020-04-16 RX ADMIN — DIATRIZOATE MEGLUMINE AND DIATRIZOATE SODIUM 30 ML: 660; 100 LIQUID ORAL; RECTAL at 15:10

## 2020-04-16 RX ADMIN — IOPAMIDOL 90 ML: 612 INJECTION, SOLUTION INTRAVENOUS at 16:24

## 2020-04-16 RX ADMIN — ONDANSETRON 4 MG: 2 INJECTION INTRAMUSCULAR; INTRAVENOUS at 14:48

## 2020-04-16 RX ADMIN — SODIUM CHLORIDE 1000 ML: 9 INJECTION, SOLUTION INTRAVENOUS at 14:47

## 2020-04-16 RX ADMIN — KETOROLAC TROMETHAMINE 30 MG: 30 INJECTION, SOLUTION INTRAMUSCULAR; INTRAVENOUS at 14:49

## 2020-04-16 NOTE — ED NOTES
Per pt request, updated father via telephone of pt condition.     Marian Floyd, RN  04/16/20 0749

## 2020-04-16 NOTE — ED NOTES
Pt states that he is still unable to provide urine specimen.      Viji Sanders, RN  04/16/20 7801

## 2020-04-16 NOTE — ED PROVIDER NOTES
Subjective   Patient presents emergency department with complaint of abdominal pain this right lower quadrant nature.  Previously seen at clinic sent to the ED due to the magnitude of his symptoms.  Has had prior hernia repair orchiectomy as well as cholecystectomy.  Patient is having pain in the right lower quadrant today x2 hours.  Patient states he had nausea with vomiting without diarrhea.  Patient is not bloating at this time.  Patient says there is some blood in the vomit.          Review of Systems   Constitutional: Negative.  Negative for appetite change, chills and fever.   HENT: Negative.  Negative for congestion.    Eyes: Negative.  Negative for photophobia and visual disturbance.   Respiratory: Negative.  Negative for cough, chest tightness and shortness of breath.    Cardiovascular: Negative.  Negative for chest pain and palpitations.   Gastrointestinal: Positive for abdominal pain, nausea and vomiting. Negative for constipation and diarrhea.   Endocrine: Negative.    Genitourinary: Negative.  Negative for decreased urine volume, dysuria, flank pain and hematuria.   Musculoskeletal: Negative.  Negative for arthralgias, back pain, myalgias, neck pain and neck stiffness.   Skin: Negative.  Negative for pallor.   Neurological: Negative.  Negative for dizziness, syncope, weakness, light-headedness, numbness and headaches.   Psychiatric/Behavioral: Negative.  Negative for confusion and suicidal ideas. The patient is not nervous/anxious.        Past Medical History:   Diagnosis Date   • ADHD (attention deficit hyperactivity disorder)     as a child   • Scoliosis deformity of spine    • Torsion of testis        Allergies   Allergen Reactions   • Lemon Extract [Flavoring Agent] Anaphylaxis   • Penicillins Rash       Past Surgical History:   Procedure Laterality Date   • CHOLECYSTECTOMY N/A 3/30/2018    Procedure: SUBTOTAL CHOLECYSTECTOMY LAPAROSCOPIC;  Surgeon: Ras Griggs MD;  [facility]: [facility] OR;   Service: General   • HERNIA REPAIR  1994    (1)    Bilateral inguinal hernia repair    • ORCHIECTOMY      Right scrotal exploration. Right detorsion. Left orchiopexy. Removal of bilateral appendage epididymis. Right orchiopexy. After removal of appendage testis and epididymis, right orchiectomy   • TESTICLE TORSION REPAIR         Family History   Problem Relation Age of Onset   • Hypertension Mother    • Diabetes Paternal Grandmother        Social History     Socioeconomic History   • Marital status: Single     Spouse name: Not on file   • Number of children: Not on file   • Years of education: Not on file   • Highest education level: Not on file   Tobacco Use   • Smoking status: Current Every Day Smoker     Packs/day: 0.50     Years: 5.00     Pack years: 2.50     Types: Cigarettes   • Smokeless tobacco: Former User     Types: Chew   Substance and Sexual Activity   • Alcohol use: No     Frequency: Never     Comment: occasionally   • Drug use: Yes     Types: Marijuana     Comment: occasionally   • Sexual activity: Defer           Objective   Physical Exam   Constitutional: He is oriented to person, place, and time. He appears well-developed and well-nourished. He appears toxic. He appears ill. He appears distressed.   HENT:   Head: Normocephalic and atraumatic.   Eyes: Pupils are equal, round, and reactive to light. Conjunctivae and EOM are normal.   Neck: Normal range of motion. Neck supple. No JVD present.   Cardiovascular: Normal rate, regular rhythm, normal heart sounds and intact distal pulses. Exam reveals no gallop and no friction rub.   No murmur heard.  Pulmonary/Chest: Effort normal. No respiratory distress. He has no wheezes. He has no rales. He exhibits no tenderness.   Abdominal: Soft. Bowel sounds are normal. He exhibits no distension and no mass. There is tenderness in the right lower quadrant. There is rebound and guarding. There is no rigidity.   Musculoskeletal: Normal range of motion.    Neurological: He is alert and oriented to person, place, and time.   Skin: Skin is warm and dry. Capillary refill takes less than 2 seconds.   Psychiatric: He has a normal mood and affect. His behavior is normal. Judgment and thought content normal.   Nursing note and vitals reviewed.      Procedures           ED Course                                 Labs Reviewed   COMPREHENSIVE METABOLIC PANEL - Abnormal; Notable for the following components:       Result Value    Glucose 122 (*)     All other components within normal limits    Narrative:     GFR Normal >60  Chronic Kidney Disease <60  Kidney Failure <15     URINALYSIS W/ MICROSCOPIC IF INDICATED (NO CULTURE) - Abnormal; Notable for the following components:    Appearance, UA Turbid (*)     Ketones, UA 40 mg/dL (2+) (*)     Leuk Esterase, UA Trace (*)     All other components within normal limits   CBC WITH AUTO DIFFERENTIAL - Abnormal; Notable for the following components:    WBC 10.86 (*)     Platelets 479 (*)     Neutrophil % 76.1 (*)     Lymphocyte % 17.1 (*)     Monocyte % 4.7 (*)     Neutrophils, Absolute 8.26 (*)     All other components within normal limits   URINE DRUG SCREEN - Abnormal; Notable for the following components:    THC, Screen, Urine Positive (*)     Methamphetamine, Ur Positive (*)     Amphetamine Screen, Urine Positive (*)     All other components within normal limits    Narrative:     Cutoff For Drugs Screened:    Amphetamines               500 ng/ml  Barbiturates               200 ng/ml  Benzodiazepines            150 ng/ml  Cocaine                    150 ng/ml  Methadone                  200 ng/ml  Opiates                    100 ng/ml  Phencyclidine               25 ng/ml  THC                            50 ng/ml  Methamphetamine            500 ng/ml  Tricyclic Antidepressants  300 ng/ml  Oxycodone                  100 ng/ml  Propoxyphene               300 ng/ml  Buprenorphine               10 ng/ml    The normal value for all drugs  tested is negative. This report includes unconfirmed screening results, with the cutoff values listed, to be used for medical treatment purposes only.  Unconfirmed results must not be used for non-medical purposes such as employment or legal testing.  Clinical consideration should be applied to any drug of abuse test, particularly when unconfirmed results are used.     LIPASE - Normal   MAGNESIUM - Normal   POCT GLUCOSE FINGERSTICK - Normal   RAINBOW DRAW    Narrative:     The following orders were created for panel order [place] Draw.  Procedure                               Abnormality         Status                     ---------                               -----------         ------                     Light Blue Top[[id number]]                                   Final result               Green Top (Gel)[[id number]]                                  Final result               Lavender Top[[id number]]                                     Final result               Gold Top - SST[[id number]]                                   Final result                 Please view results for these tests on the individual orders.   ETHANOL   URINALYSIS, MICROSCOPIC ONLY   CBC AND DIFFERENTIAL    Narrative:     The following orders were created for panel order CBC & Differential.  Procedure                               Abnormality         Status                     ---------                               -----------         ------                     CBC Auto Differential[[id number]]        Abnormal            Final result                 Please view results for these tests on the individual orders.   LIGHT BLUE TOP   GREEN TOP   LAVENDER TOP   GOLD TOP - SST       CT Abdomen Pelvis With Contrast   Final Result   CONCLUSION:   No calculi or obstructive uropathy.   No findings of appendicitis   Subsegmental atelectasis or infiltrate anteriorly in the right   middle lobe and lingular segment of the left upper lobe and   subsegmental  atelectasis anteriorly in the right lower lobe.   Patchy subsegmental infiltrates inferiorly and peripherally in   the left lower lobe.   Possible noncalcified gallstones and questionable very minimal   pericholecystic fluid.   Consider right upper quadrant ultrasound.   Horseshoe kidney.   Urinary bladder distended up to 10 cm in greatest dimension.   There appears to be some dilatation of the proximal prostatic   urethra.   Moderate to large amount retained feces in the colon.      [id number]      Electronically signed by:  Cj Grubbs MD  4/16/2020 4:56 PM CDT   Workstation: [id number]        No significant findings right lower quadrant on CT scan.  There is no clinical correlate for respiratory symptoms, nor is there any clinical correlate post cholecystectomy.  Patient without right upper quadrant pain.  Patient will be discharged with symptomatic care most likely secondary to constipation.  Patient restarted MiraLAX, Zofran as needed with interval follow-up with family medicine service.          MDM    Final diagnoses:   Right lower quadrant abdominal pain   Nausea and vomiting, intractability of vomiting not specified, unspecified vomiting type            Stephon Kay MD  04/16/20 4953

## 2020-04-16 NOTE — ED NOTES
Urinal placed at bedside and instructed patient to provide urine specimen.      Viji Sanders RN  04/16/20 7441

## 2020-05-31 ENCOUNTER — APPOINTMENT (OUTPATIENT)
Dept: CT IMAGING | Facility: HOSPITAL | Age: 25
End: 2020-05-31

## 2020-05-31 ENCOUNTER — HOSPITAL ENCOUNTER (EMERGENCY)
Facility: HOSPITAL | Age: 25
Discharge: HOME | End: 2020-05-31
Attending: EMERGENCY MEDICINE | Admitting: EMERGENCY MEDICINE

## 2020-05-31 VITALS
RESPIRATION RATE: 18 BRPM | BODY MASS INDEX: 21.54 KG/M2 | HEIGHT: 72 IN | OXYGEN SATURATION: 100 % | WEIGHT: 159 LBS | SYSTOLIC BLOOD PRESSURE: 119 MMHG | TEMPERATURE: 97.7 F | HEART RATE: 75 BPM | DIASTOLIC BLOOD PRESSURE: 64 MMHG

## 2020-05-31 DIAGNOSIS — R10.33 PERIUMBILICAL ABDOMINAL PAIN: ICD-10-CM

## 2020-05-31 DIAGNOSIS — N30.00 ACUTE CYSTITIS WITHOUT HEMATURIA: Primary | ICD-10-CM

## 2020-05-31 LAB
ALBUMIN SERPL-MCNC: 4.6 G/DL (ref 3.5–5.2)
ALBUMIN/GLOB SERPL: 1.5 G/DL
ALP SERPL-CCNC: 54 U/L (ref 39–117)
ALT SERPL W P-5'-P-CCNC: 10 U/L (ref 1–41)
AMPHET+METHAMPHET UR QL: NEGATIVE
AMPHETAMINES UR QL: NEGATIVE
AMYLASE SERPL-CCNC: 45 U/L (ref 28–100)
ANION GAP SERPL CALCULATED.3IONS-SCNC: 4 MMOL/L (ref 5–15)
AST SERPL-CCNC: 14 U/L (ref 1–40)
BACTERIA UR QL AUTO: ABNORMAL /HPF
BARBITURATES UR QL SCN: NEGATIVE
BASOPHILS # BLD AUTO: 0.04 10*3/MM3 (ref 0–0.2)
BASOPHILS NFR BLD AUTO: 0.5 % (ref 0–1.5)
BENZODIAZ UR QL SCN: NEGATIVE
BILIRUB SERPL-MCNC: 0.3 MG/DL (ref 0.2–1.2)
BILIRUB UR QL STRIP: NEGATIVE
BUN BLD-MCNC: 9 MG/DL (ref 6–20)
BUN/CREAT SERPL: 10.2 (ref 7–25)
BUPRENORPHINE SERPL-MCNC: NEGATIVE NG/ML
CALCIUM SPEC-SCNC: 9.3 MG/DL (ref 8.6–10.5)
CANNABINOIDS SERPL QL: POSITIVE
CHLORIDE SERPL-SCNC: 106 MMOL/L (ref 98–107)
CLARITY UR: ABNORMAL
CO2 SERPL-SCNC: 29 MMOL/L (ref 22–29)
COCAINE UR QL: NEGATIVE
COLOR UR: YELLOW
CREAT BLD-MCNC: 0.88 MG/DL (ref 0.76–1.27)
DEPRECATED RDW RBC AUTO: 42.7 FL (ref 37–54)
EOSINOPHIL # BLD AUTO: 0.12 10*3/MM3 (ref 0–0.4)
EOSINOPHIL NFR BLD AUTO: 1.4 % (ref 0.3–6.2)
ERYTHROCYTE [DISTWIDTH] IN BLOOD BY AUTOMATED COUNT: 13.5 % (ref 12.3–15.4)
GFR SERPL CREATININE-BSD FRML MDRD: 106 ML/MIN/1.73
GLOBULIN UR ELPH-MCNC: 3 GM/DL
GLUCOSE BLD-MCNC: 148 MG/DL (ref 65–99)
GLUCOSE UR STRIP-MCNC: NEGATIVE MG/DL
HCT VFR BLD AUTO: 44.2 % (ref 37.5–51)
HGB BLD-MCNC: 14.6 G/DL (ref 13–17.7)
HGB UR QL STRIP.AUTO: NEGATIVE
HOLD SPECIMEN: NORMAL
HYALINE CASTS UR QL AUTO: ABNORMAL /LPF
IMM GRANULOCYTES # BLD AUTO: 0.03 10*3/MM3 (ref 0–0.05)
IMM GRANULOCYTES NFR BLD AUTO: 0.3 % (ref 0–0.5)
KETONES UR QL STRIP: NEGATIVE
LEUKOCYTE ESTERASE UR QL STRIP.AUTO: ABNORMAL
LIPASE SERPL-CCNC: 28 U/L (ref 13–60)
LYMPHOCYTES # BLD AUTO: 1.66 10*3/MM3 (ref 0.7–3.1)
LYMPHOCYTES NFR BLD AUTO: 19.1 % (ref 19.6–45.3)
MAGNESIUM SERPL-MCNC: 2.2 MG/DL (ref 1.6–2.6)
MCH RBC QN AUTO: 28.6 PG (ref 26.6–33)
MCHC RBC AUTO-ENTMCNC: 33 G/DL (ref 31.5–35.7)
MCV RBC AUTO: 86.5 FL (ref 79–97)
METHADONE UR QL SCN: NEGATIVE
MONOCYTES # BLD AUTO: 0.63 10*3/MM3 (ref 0.1–0.9)
MONOCYTES NFR BLD AUTO: 7.2 % (ref 5–12)
NEUTROPHILS # BLD AUTO: 6.21 10*3/MM3 (ref 1.7–7)
NEUTROPHILS NFR BLD AUTO: 71.5 % (ref 42.7–76)
NITRITE UR QL STRIP: NEGATIVE
NRBC BLD AUTO-RTO: 0 /100 WBC (ref 0–0.2)
OPIATES UR QL: NEGATIVE
OXYCODONE UR QL SCN: NEGATIVE
PCP UR QL SCN: NEGATIVE
PH UR STRIP.AUTO: 8 [PH] (ref 5–9)
PLATELET # BLD AUTO: 313 10*3/MM3 (ref 140–450)
PMV BLD AUTO: 10 FL (ref 6–12)
POTASSIUM BLD-SCNC: 4.1 MMOL/L (ref 3.5–5.2)
PROPOXYPH UR QL: NEGATIVE
PROT SERPL-MCNC: 7.6 G/DL (ref 6–8.5)
PROT UR QL STRIP: NEGATIVE
RBC # BLD AUTO: 5.11 10*6/MM3 (ref 4.14–5.8)
RBC # UR: ABNORMAL /HPF
REF LAB TEST METHOD: ABNORMAL
SODIUM BLD-SCNC: 139 MMOL/L (ref 136–145)
SP GR UR STRIP: 1.02 (ref 1–1.03)
SQUAMOUS #/AREA URNS HPF: ABNORMAL /HPF
TRICYCLICS UR QL SCN: NEGATIVE
UROBILINOGEN UR QL STRIP: ABNORMAL
WBC NRBC COR # BLD: 8.69 10*3/MM3 (ref 3.4–10.8)
WBC UR QL AUTO: ABNORMAL /HPF
WHOLE BLOOD HOLD SPECIMEN: NORMAL
WHOLE BLOOD HOLD SPECIMEN: NORMAL

## 2020-05-31 PROCEDURE — 83735 ASSAY OF MAGNESIUM: CPT | Performed by: PHYSICIAN ASSISTANT

## 2020-05-31 PROCEDURE — 80053 COMPREHEN METABOLIC PANEL: CPT | Performed by: PHYSICIAN ASSISTANT

## 2020-05-31 PROCEDURE — 25010000002 IOPAMIDOL 61 % SOLUTION: Performed by: EMERGENCY MEDICINE

## 2020-05-31 PROCEDURE — 85025 COMPLETE CBC W/AUTO DIFF WBC: CPT | Performed by: PHYSICIAN ASSISTANT

## 2020-05-31 PROCEDURE — 99283 EMERGENCY DEPT VISIT LOW MDM: CPT

## 2020-05-31 PROCEDURE — 96374 THER/PROPH/DIAG INJ IV PUSH: CPT

## 2020-05-31 PROCEDURE — 83690 ASSAY OF LIPASE: CPT | Performed by: PHYSICIAN ASSISTANT

## 2020-05-31 PROCEDURE — 80306 DRUG TEST PRSMV INSTRMNT: CPT | Performed by: PHYSICIAN ASSISTANT

## 2020-05-31 PROCEDURE — 25010000002 ONDANSETRON PER 1 MG: Performed by: EMERGENCY MEDICINE

## 2020-05-31 PROCEDURE — 74177 CT ABD & PELVIS W/CONTRAST: CPT

## 2020-05-31 PROCEDURE — 81001 URINALYSIS AUTO W/SCOPE: CPT | Performed by: PHYSICIAN ASSISTANT

## 2020-05-31 PROCEDURE — 82150 ASSAY OF AMYLASE: CPT | Performed by: PHYSICIAN ASSISTANT

## 2020-05-31 RX ORDER — SODIUM CHLORIDE 0.9 % (FLUSH) 0.9 %
10 SYRINGE (ML) INJECTION AS NEEDED
Status: DISCONTINUED | OUTPATIENT
Start: 2020-05-31 | End: 2020-05-31

## 2020-05-31 RX ORDER — ONDANSETRON 2 MG/ML
4 INJECTION INTRAMUSCULAR; INTRAVENOUS ONCE
Status: COMPLETED | OUTPATIENT
Start: 2020-05-31 | End: 2020-05-31

## 2020-05-31 RX ORDER — CEPHALEXIN 500 MG/1
500 CAPSULE ORAL 3 TIMES DAILY
Qty: 21 CAPSULE | Refills: 0 | Status: SHIPPED | OUTPATIENT
Start: 2020-05-31 | End: 2020-06-07

## 2020-05-31 RX ORDER — SODIUM CHLORIDE 9 MG/ML
INJECTION, SOLUTION INTRAVENOUS
Status: COMPLETED
Start: 2020-05-31 | End: 2020-05-31

## 2020-05-31 RX ORDER — KETOROLAC TROMETHAMINE 30 MG/ML
30 INJECTION, SOLUTION INTRAMUSCULAR; INTRAVENOUS EVERY 6 HOURS PRN
Status: DISCONTINUED | OUTPATIENT
Start: 2020-05-31 | End: 2020-05-31 | Stop reason: HOSPADM

## 2020-05-31 RX ORDER — SODIUM CHLORIDE 0.9 % (FLUSH) 0.9 %
10 SYRINGE (ML) INJECTION AS NEEDED
Status: DISCONTINUED | OUTPATIENT
Start: 2020-05-31 | End: 2020-05-31 | Stop reason: HOSPADM

## 2020-05-31 RX ADMIN — SODIUM CHLORIDE 1000 ML: 900 INJECTION, SOLUTION INTRAVENOUS at 12:08

## 2020-05-31 RX ADMIN — IOPAMIDOL 80 ML: 612 INJECTION, SOLUTION INTRAVENOUS at 13:41

## 2020-05-31 RX ADMIN — ONDANSETRON 4 MG: 2 INJECTION INTRAMUSCULAR; INTRAVENOUS at 12:59

## 2020-05-31 RX ADMIN — Medication 10 ML: at 12:00

## 2020-06-02 ENCOUNTER — HOSPITAL ENCOUNTER (EMERGENCY)
Facility: HOSPITAL | Age: 25
Discharge: HOME | End: 2020-06-02
Attending: FAMILY MEDICINE | Admitting: FAMILY MEDICINE

## 2020-06-02 ENCOUNTER — APPOINTMENT (OUTPATIENT)
Dept: CT IMAGING | Facility: HOSPITAL | Age: 25
End: 2020-06-02

## 2020-06-02 VITALS
WEIGHT: 161.9 LBS | OXYGEN SATURATION: 95 % | BODY MASS INDEX: 21.93 KG/M2 | HEART RATE: 56 BPM | HEIGHT: 72 IN | TEMPERATURE: 96.6 F | SYSTOLIC BLOOD PRESSURE: 140 MMHG | RESPIRATION RATE: 18 BRPM | DIASTOLIC BLOOD PRESSURE: 89 MMHG

## 2020-06-02 DIAGNOSIS — R10.11 RIGHT UPPER QUADRANT ABDOMINAL PAIN: Primary | ICD-10-CM

## 2020-06-02 LAB
ALBUMIN SERPL-MCNC: 4.4 G/DL (ref 3.5–5.2)
ALBUMIN/GLOB SERPL: 1.5 G/DL
ALP SERPL-CCNC: 54 U/L (ref 39–117)
ALT SERPL W P-5'-P-CCNC: 13 U/L (ref 1–41)
ANION GAP SERPL CALCULATED.3IONS-SCNC: 11 MMOL/L (ref 5–15)
AST SERPL-CCNC: 13 U/L (ref 1–40)
BASOPHILS # BLD AUTO: 0.07 10*3/MM3 (ref 0–0.2)
BASOPHILS NFR BLD AUTO: 0.7 % (ref 0–1.5)
BILIRUB SERPL-MCNC: 0.3 MG/DL (ref 0.2–1.2)
BILIRUB UR QL STRIP: NEGATIVE
BUN BLD-MCNC: 13 MG/DL (ref 6–20)
BUN/CREAT SERPL: 15.7 (ref 7–25)
CALCIUM SPEC-SCNC: 8.9 MG/DL (ref 8.6–10.5)
CHLORIDE SERPL-SCNC: 103 MMOL/L (ref 98–107)
CK SERPL-CCNC: 40 U/L (ref 20–200)
CLARITY UR: CLEAR
CO2 SERPL-SCNC: 25 MMOL/L (ref 22–29)
COLOR UR: YELLOW
CREAT BLD-MCNC: 0.83 MG/DL (ref 0.76–1.27)
DEPRECATED RDW RBC AUTO: 41.1 FL (ref 37–54)
EOSINOPHIL # BLD AUTO: 0.22 10*3/MM3 (ref 0–0.4)
EOSINOPHIL NFR BLD AUTO: 2.2 % (ref 0.3–6.2)
ERYTHROCYTE [DISTWIDTH] IN BLOOD BY AUTOMATED COUNT: 13.3 % (ref 12.3–15.4)
GFR SERPL CREATININE-BSD FRML MDRD: 113 ML/MIN/1.73
GLOBULIN UR ELPH-MCNC: 2.9 GM/DL
GLUCOSE BLD-MCNC: 147 MG/DL (ref 65–99)
GLUCOSE UR STRIP-MCNC: NEGATIVE MG/DL
HCT VFR BLD AUTO: 44.9 % (ref 37.5–51)
HGB BLD-MCNC: 14.9 G/DL (ref 13–17.7)
HGB UR QL STRIP.AUTO: NEGATIVE
HOLD SPECIMEN: NORMAL
IMM GRANULOCYTES # BLD AUTO: 0.03 10*3/MM3 (ref 0–0.05)
IMM GRANULOCYTES NFR BLD AUTO: 0.3 % (ref 0–0.5)
KETONES UR QL STRIP: NEGATIVE
LEUKOCYTE ESTERASE UR QL STRIP.AUTO: NEGATIVE
LIPASE SERPL-CCNC: 56 U/L (ref 13–60)
LYMPHOCYTES # BLD AUTO: 2.35 10*3/MM3 (ref 0.7–3.1)
LYMPHOCYTES NFR BLD AUTO: 23.6 % (ref 19.6–45.3)
MCH RBC QN AUTO: 28 PG (ref 26.6–33)
MCHC RBC AUTO-ENTMCNC: 33.2 G/DL (ref 31.5–35.7)
MCV RBC AUTO: 84.4 FL (ref 79–97)
MONOCYTES # BLD AUTO: 0.68 10*3/MM3 (ref 0.1–0.9)
MONOCYTES NFR BLD AUTO: 6.8 % (ref 5–12)
NEUTROPHILS # BLD AUTO: 6.62 10*3/MM3 (ref 1.7–7)
NEUTROPHILS NFR BLD AUTO: 66.4 % (ref 42.7–76)
NITRITE UR QL STRIP: NEGATIVE
NRBC BLD AUTO-RTO: 0 /100 WBC (ref 0–0.2)
PH UR STRIP.AUTO: 6 [PH] (ref 5–9)
PLATELET # BLD AUTO: 333 10*3/MM3 (ref 140–450)
PMV BLD AUTO: 10.2 FL (ref 6–12)
POTASSIUM BLD-SCNC: 4 MMOL/L (ref 3.5–5.2)
PROT SERPL-MCNC: 7.3 G/DL (ref 6–8.5)
PROT UR QL STRIP: NEGATIVE
RBC # BLD AUTO: 5.32 10*6/MM3 (ref 4.14–5.8)
SODIUM BLD-SCNC: 139 MMOL/L (ref 136–145)
SP GR UR STRIP: 1.02 (ref 1–1.03)
UROBILINOGEN UR QL STRIP: NORMAL
WBC NRBC COR # BLD: 9.97 10*3/MM3 (ref 3.4–10.8)
WHOLE BLOOD HOLD SPECIMEN: NORMAL

## 2020-06-02 PROCEDURE — 85025 COMPLETE CBC W/AUTO DIFF WBC: CPT | Performed by: FAMILY MEDICINE

## 2020-06-02 PROCEDURE — 99284 EMERGENCY DEPT VISIT MOD MDM: CPT

## 2020-06-02 PROCEDURE — 80053 COMPREHEN METABOLIC PANEL: CPT | Performed by: FAMILY MEDICINE

## 2020-06-02 PROCEDURE — 81003 URINALYSIS AUTO W/O SCOPE: CPT | Performed by: FAMILY MEDICINE

## 2020-06-02 PROCEDURE — 96375 TX/PRO/DX INJ NEW DRUG ADDON: CPT

## 2020-06-02 PROCEDURE — 25010000002 MORPHINE PER 10 MG: Performed by: FAMILY MEDICINE

## 2020-06-02 PROCEDURE — 25010000002 ONDANSETRON PER 1 MG: Performed by: FAMILY MEDICINE

## 2020-06-02 PROCEDURE — 96374 THER/PROPH/DIAG INJ IV PUSH: CPT

## 2020-06-02 PROCEDURE — 83690 ASSAY OF LIPASE: CPT | Performed by: FAMILY MEDICINE

## 2020-06-02 PROCEDURE — 96376 TX/PRO/DX INJ SAME DRUG ADON: CPT

## 2020-06-02 PROCEDURE — 82550 ASSAY OF CK (CPK): CPT | Performed by: FAMILY MEDICINE

## 2020-06-02 PROCEDURE — 74176 CT ABD & PELVIS W/O CONTRAST: CPT

## 2020-06-02 PROCEDURE — 96361 HYDRATE IV INFUSION ADD-ON: CPT

## 2020-06-02 RX ORDER — HYDROCODONE BITARTRATE AND ACETAMINOPHEN 5; 325 MG/1; MG/1
1 TABLET ORAL EVERY 6 HOURS PRN
Qty: 12 TABLET | Refills: 0 | Status: SHIPPED | OUTPATIENT
Start: 2020-06-02 | End: 2020-06-17 | Stop reason: SDUPTHER

## 2020-06-02 RX ORDER — SODIUM CHLORIDE 9 MG/ML
125 INJECTION, SOLUTION INTRAVENOUS CONTINUOUS
Status: DISCONTINUED | OUTPATIENT
Start: 2020-06-02 | End: 2020-06-02

## 2020-06-02 RX ORDER — ONDANSETRON 2 MG/ML
4 INJECTION INTRAMUSCULAR; INTRAVENOUS ONCE
Status: COMPLETED | OUTPATIENT
Start: 2020-06-02 | End: 2020-06-02

## 2020-06-02 RX ORDER — ONDANSETRON 4 MG/1
4 TABLET, ORALLY DISINTEGRATING ORAL EVERY 6 HOURS PRN
Qty: 10 TABLET | Refills: 0 | Status: SHIPPED | OUTPATIENT
Start: 2020-06-02 | End: 2020-06-17 | Stop reason: SDUPTHER

## 2020-06-02 RX ADMIN — ONDANSETRON 4 MG: 2 INJECTION INTRAMUSCULAR; INTRAVENOUS at 11:01

## 2020-06-02 RX ADMIN — MORPHINE SULFATE 4 MG: 4 INJECTION, SOLUTION INTRAMUSCULAR; INTRAVENOUS at 11:01

## 2020-06-02 RX ADMIN — SODIUM CHLORIDE 1000 ML: 900 INJECTION, SOLUTION INTRAVENOUS at 11:01

## 2020-06-02 RX ADMIN — MORPHINE SULFATE 4 MG: 4 INJECTION INTRAVENOUS at 14:31

## 2020-06-02 NOTE — ED PROVIDER NOTES
Subjective   Right flank pain x 3 days.       Flank Pain   Pain location:  R flank  Pain quality: aching    Pain radiates to:  Does not radiate  Pain severity:  Moderate  Onset quality:  Sudden  Duration:  3 days  Timing:  Constant  Progression:  Worsening  Chronicity:  Recurrent  Relieved by:  Heat  Worsened by:  Nothing  Associated symptoms: nausea and vomiting    Associated symptoms: no chest pain, no chills, no constipation, no cough, no diarrhea, no dysuria, no fatigue, no fever, no shortness of breath and no sore throat    Risk factors: not obese        Review of Systems   Constitutional: Positive for diaphoresis. Negative for appetite change, chills, fatigue and fever.   HENT: Negative for congestion, ear discharge, ear pain, nosebleeds, rhinorrhea, sinus pressure, sore throat and trouble swallowing.    Eyes: Negative for discharge and redness.   Respiratory: Negative for apnea, cough, chest tightness, shortness of breath and wheezing.    Cardiovascular: Negative for chest pain.   Gastrointestinal: Positive for nausea and vomiting. Negative for abdominal pain, constipation and diarrhea.   Endocrine: Negative for polyuria.   Genitourinary: Positive for flank pain. Negative for dysuria, frequency and urgency.   Musculoskeletal: Negative for myalgias and neck pain.   Skin: Negative for color change and rash.   Allergic/Immunologic: Negative for immunocompromised state.   Neurological: Negative for dizziness, seizures, syncope, weakness, light-headedness and headaches.   Hematological: Negative for adenopathy. Does not bruise/bleed easily.   Psychiatric/Behavioral: Negative for behavioral problems and confusion.   All other systems reviewed and are negative.      Past Medical History:   Diagnosis Date   • ADHD (attention deficit hyperactivity disorder)     as a child   • Scoliosis deformity of spine    • Torsion of testis        Allergies   Allergen Reactions   • Lemon Extract [Flavoring Agent] Anaphylaxis   •  Penicillins Rash       Past Surgical History:   Procedure Laterality Date   • CHOLECYSTECTOMY N/A 3/30/2018    Procedure: SUBTOTAL CHOLECYSTECTOMY LAPAROSCOPIC;  Surgeon: Ras Griggs MD;  Location: [facility];  Service: General   • HERNIA REPAIR  1994    (1)    Bilateral inguinal hernia repair    • ORCHIECTOMY      Right scrotal exploration. Right detorsion. Left orchiopexy. Removal of bilateral appendage epididymis. Right orchiopexy. After removal of appendage testis and epididymis, right orchiectomy   • TESTICLE TORSION REPAIR         Family History   Problem Relation Age of Onset   • Hypertension Mother    • Diabetes Paternal Grandmother        Social History     Socioeconomic History   • Marital status: Single     Spouse name: Not on file   • Number of children: Not on file   • Years of education: Not on file   • Highest education level: Not on file   Tobacco Use   • Smoking status: Current Every Day Smoker     Packs/day: 0.50     Years: 5.00     Pack years: 2.50     Types: Cigarettes   • Smokeless tobacco: Former User     Types: Chew   Substance and Sexual Activity   • Alcohol use: No     Frequency: Never     Comment: occasionally   • Drug use: Yes     Types: Marijuana     Comment: occasionally   • Sexual activity: Defer           Objective   Physical Exam   Constitutional: He is oriented to person, place, and time. He appears well-developed and well-nourished.   HENT:   Head: Normocephalic and atraumatic.   Nose: Nose normal.   Mouth/Throat: Oropharynx is clear and moist.   Eyes: Pupils are equal, round, and reactive to light. Conjunctivae and EOM are normal. Right eye exhibits no discharge. Left eye exhibits no discharge. No scleral icterus.   Neck: Normal range of motion. Neck supple. No tracheal deviation present.   Cardiovascular: Normal rate, regular rhythm and normal heart sounds.   No murmur heard.  Pulmonary/Chest: Effort normal and breath sounds normal. No stridor. No respiratory distress.  He has no wheezes. He has no rales.   Abdominal: Soft. Bowel sounds are normal. He exhibits no distension and no mass. There is no tenderness. There is CVA tenderness. There is no rebound and no guarding.   Musculoskeletal: He exhibits no edema.   Neurological: He is alert and oriented to person, place, and time. Coordination normal.   Skin: Skin is warm and dry. No rash noted. No erythema.   Psychiatric: He has a normal mood and affect. His behavior is normal. Thought content normal.   Nursing note and vitals reviewed.      Procedures           ED Course  ED Course as of Jun 02 1407   Tue Jun 02, 2020   1349 Findings were discussed with Dr. Griggs who performed the subtotal cholecystectomy 2 years ago.  He states that he will be happy to see patient in his office.  Patient was advised to follow-up with Dr. Griggs for further treatment of his abdominal pain.    [CB]      ED Course User Index  [CB] Mikey Dos Santos MD                   Labs Reviewed   COMPREHENSIVE METABOLIC PANEL - Abnormal; Notable for the following components:       Result Value    Glucose 147 (*)     All other components within normal limits    Narrative:     GFR Normal >60  Chronic Kidney Disease <60  Kidney Failure <15     LIPASE - Normal   URINALYSIS W/ CULTURE IF INDICATED - Normal    Narrative:     Urine microscopic not indicated.   CK - Normal   CBC WITH AUTO DIFFERENTIAL - Normal   CBC AND DIFFERENTIAL    Narrative:     The following orders were created for panel order CBC & Differential.  Procedure                               Abnormality         Status                     ---------                               -----------         ------                     CBC Auto Differential[[id number]]        Normal              Final result                 Please view results for these tests on the individual orders.   EXTRA TUBES    Narrative:     The following orders were created for panel order Extra Tubes.  Procedure                                Abnormality         Status                     ---------                               -----------         ------                     Light Blue Top[[id number]]                                   Final result               [facility][[id number]]                                   Final result                 Please view results for these tests on the individual orders.   LIGHT BLUE TOP   [facility]       CT Abdomen Pelvis Without Contrast   Final Result   CONCLUSION:    No acute CT abnormality.   Normal appendix visualized.   No hydronephrosis.   Horseshoe kidney.      Electronically signed by:  Oz Wild MD  6/2/2020 12:06 PM CDT   Workstation: [id number]        Request # : [id number]                               [facility]    Final diagnoses:   Right upper quadrant abdominal pain            Mikey Dos Santos MD  06/02/20 140

## 2020-06-02 NOTE — ED NOTES
Pt states he cannot provide urine sample at this time- informed pt that is all we are waiting on to collect. Pt informed we just need a few drops, to just try to provide sample.     Danisha Perla RN  06/02/20 114

## 2020-06-02 NOTE — ED TRIAGE NOTES
Pt presents with complaint of pain to right upper flank x2 days. He says he was seen in this ED for similar pain and dx with UTI.

## 2020-06-17 ENCOUNTER — HOSPITAL ENCOUNTER (EMERGENCY)
Facility: HOSPITAL | Age: 25
Discharge: HOME | End: 2020-06-17
Attending: EMERGENCY MEDICINE | Admitting: EMERGENCY MEDICINE

## 2020-06-17 ENCOUNTER — APPOINTMENT (OUTPATIENT)
Dept: ULTRASOUND IMAGING | Facility: HOSPITAL | Age: 25
End: 2020-06-17

## 2020-06-17 VITALS
DIASTOLIC BLOOD PRESSURE: 71 MMHG | TEMPERATURE: 98.4 F | OXYGEN SATURATION: 98 % | HEART RATE: 56 BPM | BODY MASS INDEX: 21.93 KG/M2 | WEIGHT: 161.9 LBS | SYSTOLIC BLOOD PRESSURE: 119 MMHG | HEIGHT: 72 IN | RESPIRATION RATE: 18 BRPM

## 2020-06-17 DIAGNOSIS — K80.20 GALLSTONES: Primary | ICD-10-CM

## 2020-06-17 DIAGNOSIS — D13.5 ADENOMYOMATOSIS OF GALLBLADDER: ICD-10-CM

## 2020-06-17 LAB
ALBUMIN SERPL-MCNC: 4.3 G/DL (ref 3.5–5.2)
ALBUMIN/GLOB SERPL: 1.6 G/DL
ALP SERPL-CCNC: 52 U/L (ref 39–117)
ALT SERPL W P-5'-P-CCNC: 10 U/L (ref 1–41)
AMPHET+METHAMPHET UR QL: POSITIVE
AMPHETAMINES UR QL: NEGATIVE
ANION GAP SERPL CALCULATED.3IONS-SCNC: 9 MMOL/L (ref 5–15)
AST SERPL-CCNC: 12 U/L (ref 1–40)
BARBITURATES UR QL SCN: NEGATIVE
BASOPHILS # BLD AUTO: 0.06 10*3/MM3 (ref 0–0.2)
BASOPHILS NFR BLD AUTO: 0.6 % (ref 0–1.5)
BENZODIAZ UR QL SCN: NEGATIVE
BILIRUB SERPL-MCNC: 0.3 MG/DL (ref 0.2–1.2)
BILIRUB UR QL STRIP: NEGATIVE
BUN BLD-MCNC: 7 MG/DL (ref 6–20)
BUN/CREAT SERPL: 8.9 (ref 7–25)
BUPRENORPHINE SERPL-MCNC: NEGATIVE NG/ML
CALCIUM SPEC-SCNC: 9.1 MG/DL (ref 8.6–10.5)
CANNABINOIDS SERPL QL: POSITIVE
CHLORIDE SERPL-SCNC: 102 MMOL/L (ref 98–107)
CLARITY UR: CLEAR
CO2 SERPL-SCNC: 26 MMOL/L (ref 22–29)
COCAINE UR QL: NEGATIVE
COLOR UR: YELLOW
CREAT BLD-MCNC: 0.79 MG/DL (ref 0.76–1.27)
DEPRECATED RDW RBC AUTO: 41.5 FL (ref 37–54)
EOSINOPHIL # BLD AUTO: 0.13 10*3/MM3 (ref 0–0.4)
EOSINOPHIL NFR BLD AUTO: 1.3 % (ref 0.3–6.2)
ERYTHROCYTE [DISTWIDTH] IN BLOOD BY AUTOMATED COUNT: 13.4 % (ref 12.3–15.4)
GFR SERPL CREATININE-BSD FRML MDRD: 119 ML/MIN/1.73
GLOBULIN UR ELPH-MCNC: 2.7 GM/DL
GLUCOSE BLD-MCNC: 87 MG/DL (ref 65–99)
GLUCOSE UR STRIP-MCNC: NEGATIVE MG/DL
HCT VFR BLD AUTO: 43.4 % (ref 37.5–51)
HGB BLD-MCNC: 14.6 G/DL (ref 13–17.7)
HGB UR QL STRIP.AUTO: NEGATIVE
HOLD SPECIMEN: NORMAL
HOLD SPECIMEN: NORMAL
IMM GRANULOCYTES # BLD AUTO: 0.02 10*3/MM3 (ref 0–0.05)
IMM GRANULOCYTES NFR BLD AUTO: 0.2 % (ref 0–0.5)
KETONES UR QL STRIP: NEGATIVE
LEUKOCYTE ESTERASE UR QL STRIP.AUTO: NEGATIVE
LIPASE SERPL-CCNC: 20 U/L (ref 13–60)
LYMPHOCYTES # BLD AUTO: 2.44 10*3/MM3 (ref 0.7–3.1)
LYMPHOCYTES NFR BLD AUTO: 23.8 % (ref 19.6–45.3)
MCH RBC QN AUTO: 28.6 PG (ref 26.6–33)
MCHC RBC AUTO-ENTMCNC: 33.6 G/DL (ref 31.5–35.7)
MCV RBC AUTO: 84.9 FL (ref 79–97)
METHADONE UR QL SCN: NEGATIVE
MONOCYTES # BLD AUTO: 1.15 10*3/MM3 (ref 0.1–0.9)
MONOCYTES NFR BLD AUTO: 11.2 % (ref 5–12)
NEUTROPHILS # BLD AUTO: 6.44 10*3/MM3 (ref 1.7–7)
NEUTROPHILS NFR BLD AUTO: 62.9 % (ref 42.7–76)
NITRITE UR QL STRIP: NEGATIVE
NRBC BLD AUTO-RTO: 0 /100 WBC (ref 0–0.2)
OPIATES UR QL: NEGATIVE
OXYCODONE UR QL SCN: NEGATIVE
PCP UR QL SCN: NEGATIVE
PH UR STRIP.AUTO: 6.5 [PH] (ref 5–9)
PLATELET # BLD AUTO: 310 10*3/MM3 (ref 140–450)
PMV BLD AUTO: 10.2 FL (ref 6–12)
POTASSIUM BLD-SCNC: 4.1 MMOL/L (ref 3.5–5.2)
PROPOXYPH UR QL: NEGATIVE
PROT SERPL-MCNC: 7 G/DL (ref 6–8.5)
PROT UR QL STRIP: NEGATIVE
RBC # BLD AUTO: 5.11 10*6/MM3 (ref 4.14–5.8)
SODIUM BLD-SCNC: 137 MMOL/L (ref 136–145)
SP GR UR STRIP: 1.01 (ref 1–1.03)
TRICYCLICS UR QL SCN: NEGATIVE
UROBILINOGEN UR QL STRIP: NORMAL
WBC NRBC COR # BLD: 10.24 10*3/MM3 (ref 3.4–10.8)
WHOLE BLOOD HOLD SPECIMEN: NORMAL
WHOLE BLOOD HOLD SPECIMEN: NORMAL

## 2020-06-17 PROCEDURE — 76705 ECHO EXAM OF ABDOMEN: CPT

## 2020-06-17 PROCEDURE — 99284 EMERGENCY DEPT VISIT MOD MDM: CPT

## 2020-06-17 PROCEDURE — 25010000002 KETOROLAC TROMETHAMINE PER 15 MG: Performed by: NURSE PRACTITIONER

## 2020-06-17 PROCEDURE — 85025 COMPLETE CBC W/AUTO DIFF WBC: CPT | Performed by: NURSE PRACTITIONER

## 2020-06-17 PROCEDURE — 25010000002 DICYCLOMINE PER 20 MG: Performed by: NURSE PRACTITIONER

## 2020-06-17 PROCEDURE — 96375 TX/PRO/DX INJ NEW DRUG ADDON: CPT

## 2020-06-17 PROCEDURE — 25010000002 ONDANSETRON PER 1 MG: Performed by: NURSE PRACTITIONER

## 2020-06-17 PROCEDURE — 80053 COMPREHEN METABOLIC PANEL: CPT | Performed by: NURSE PRACTITIONER

## 2020-06-17 PROCEDURE — 81003 URINALYSIS AUTO W/O SCOPE: CPT | Performed by: NURSE PRACTITIONER

## 2020-06-17 PROCEDURE — 96372 THER/PROPH/DIAG INJ SC/IM: CPT

## 2020-06-17 PROCEDURE — 83690 ASSAY OF LIPASE: CPT | Performed by: NURSE PRACTITIONER

## 2020-06-17 PROCEDURE — 96374 THER/PROPH/DIAG INJ IV PUSH: CPT

## 2020-06-17 PROCEDURE — 80306 DRUG TEST PRSMV INSTRMNT: CPT | Performed by: NURSE PRACTITIONER

## 2020-06-17 RX ORDER — DICYCLOMINE HYDROCHLORIDE 10 MG/ML
20 INJECTION INTRAMUSCULAR ONCE
Status: COMPLETED | OUTPATIENT
Start: 2020-06-17 | End: 2020-06-17

## 2020-06-17 RX ORDER — KETOROLAC TROMETHAMINE 30 MG/ML
30 INJECTION, SOLUTION INTRAMUSCULAR; INTRAVENOUS ONCE
Status: COMPLETED | OUTPATIENT
Start: 2020-06-17 | End: 2020-06-17

## 2020-06-17 RX ORDER — DICYCLOMINE HCL 20 MG
20 TABLET ORAL EVERY 6 HOURS PRN
Qty: 15 TABLET | Refills: 0 | Status: SHIPPED | OUTPATIENT
Start: 2020-06-17

## 2020-06-17 RX ORDER — ONDANSETRON 2 MG/ML
4 INJECTION INTRAMUSCULAR; INTRAVENOUS ONCE
Status: COMPLETED | OUTPATIENT
Start: 2020-06-17 | End: 2020-06-17

## 2020-06-17 RX ORDER — HYDROCODONE BITARTRATE AND ACETAMINOPHEN 5; 325 MG/1; MG/1
1 TABLET ORAL EVERY 6 HOURS PRN
Qty: 8 TABLET | Refills: 0 | Status: SHIPPED | OUTPATIENT
Start: 2020-06-17

## 2020-06-17 RX ORDER — ONDANSETRON 4 MG/1
4 TABLET, ORALLY DISINTEGRATING ORAL EVERY 6 HOURS PRN
Qty: 10 TABLET | Refills: 0 | Status: SHIPPED | OUTPATIENT
Start: 2020-06-17

## 2020-06-17 RX ORDER — SODIUM CHLORIDE 0.9 % (FLUSH) 0.9 %
10 SYRINGE (ML) INJECTION AS NEEDED
Status: DISCONTINUED | OUTPATIENT
Start: 2020-06-17 | End: 2020-06-17 | Stop reason: HOSPADM

## 2020-06-17 RX ORDER — FAMOTIDINE 10 MG/ML
20 INJECTION, SOLUTION INTRAVENOUS ONCE
Status: COMPLETED | OUTPATIENT
Start: 2020-06-17 | End: 2020-06-17

## 2020-06-17 RX ADMIN — FAMOTIDINE 20 MG: 10 INJECTION INTRAVENOUS at 14:47

## 2020-06-17 RX ADMIN — KETOROLAC TROMETHAMINE 30 MG: 30 INJECTION, SOLUTION INTRAMUSCULAR; INTRAVENOUS at 14:47

## 2020-06-17 RX ADMIN — DICYCLOMINE HYDROCHLORIDE 20 MG: 20 INJECTION INTRAMUSCULAR at 14:59

## 2020-06-17 RX ADMIN — SODIUM CHLORIDE, PRESERVATIVE FREE 10 ML: 5 INJECTION INTRAVENOUS at 14:48

## 2020-06-17 RX ADMIN — ONDANSETRON 4 MG: 2 INJECTION INTRAMUSCULAR; INTRAVENOUS at 14:47

## 2020-06-17 NOTE — DISCHARGE INSTRUCTIONS
Your ultrasound today showed cholelithiasis (gallstones) and possible gallbladder adenomyomatosis. Follow up with Dr. Griggs on Friday 6/19/20 at 3:45 for further evaluation of your right upper quadrant pain. Fill your prescription and take as needed for pain. Return to the ER as needed.

## 2020-06-17 NOTE — ED PROVIDER NOTES
Subjective   Patient presents to the ER with recurrent RUQ pain. He states it started about 2-3 weeks ago and he has been seen twice in the ER for the same pain. He reports he has surgery by Dr. Griggs March 2018 where he had a subtotal cholecystectomy. He reports pain 5/10 with nausea (no vomiting). Denies pain radiating in to back.           Review of Systems   Constitutional: Negative for activity change, chills, diaphoresis, fatigue and fever.   HENT: Negative.    Respiratory: Negative for cough, chest tightness, shortness of breath and wheezing.    Cardiovascular: Negative for chest pain and palpitations.   Gastrointestinal: Positive for abdominal pain and nausea. Negative for abdominal distention, constipation, diarrhea and vomiting.   Genitourinary: Negative.    Musculoskeletal: Negative.    Skin: Negative.    Neurological: Negative for dizziness, syncope, speech difficulty, weakness, numbness and headaches.   Psychiatric/Behavioral: Negative.        Past Medical History:   Diagnosis Date   • ADHD (attention deficit hyperactivity disorder)     as a child   • Scoliosis deformity of spine    • Torsion of testis        Allergies   Allergen Reactions   • Lemon Extract [Flavoring Agent] Anaphylaxis   • Penicillins Rash       Past Surgical History:   Procedure Laterality Date   • CHOLECYSTECTOMY N/A 3/30/2018    Procedure: SUBTOTAL CHOLECYSTECTOMY LAPAROSCOPIC;  Surgeon: Ras Griggs MD;  Location: [facility];  Service: General   • HERNIA REPAIR  1994    (1)    Bilateral inguinal hernia repair    • ORCHIECTOMY      Right scrotal exploration. Right detorsion. Left orchiopexy. Removal of bilateral appendage epididymis. Right orchiopexy. After removal of appendage testis and epididymis, right orchiectomy   • TESTICLE TORSION REPAIR         Family History   Problem Relation Age of Onset   • Hypertension Mother    • Diabetes Paternal Grandmother        Social History     Socioeconomic History   • Marital  "status: Single     Spouse name: Not on file   • Number of children: Not on file   • Years of education: Not on file   • Highest education level: Not on file   Tobacco Use   • Smoking status: Current Every Day Smoker     Packs/day: 0.50     Years: 5.00     Pack years: 2.50     Types: Cigarettes   • Smokeless tobacco: Former User     Types: Chew   Substance and Sexual Activity   • Alcohol use: No     Frequency: Never     Comment: occasionally   • Drug use: Yes     Types: Marijuana     Comment: occasionally   • Sexual activity: Defer           Objective    /71   Pulse 56   Temp 98.4 °F (36.9 °C)   Resp 18   Ht 182.9 cm (72\")   Wt 73.4 kg (161 lb 14.4 oz)   SpO2 98%   BMI 21.96 kg/m²     Physical Exam   Constitutional: He is oriented to person, place, and time. He appears well-developed and well-nourished. No distress.   HENT:   Head: Normocephalic and atraumatic.   Neck: Normal range of motion. Neck supple.   Cardiovascular: Normal rate, regular rhythm, normal heart sounds and intact distal pulses.   No murmur heard.  Pulmonary/Chest: Effort normal and breath sounds normal. No respiratory distress. He has no wheezes.   Abdominal: Soft. Bowel sounds are normal. He exhibits no distension. There is tenderness (RUQ and epigstric region).   Musculoskeletal: Normal range of motion.   Neurological: He is alert and oriented to person, place, and time. Coordination normal.   Skin: Skin is warm and dry. Capillary refill takes less than 2 seconds.   Psychiatric: He has a normal mood and affect. His behavior is normal. Judgment and thought content normal.   Nursing note and vitals reviewed.      Procedures           ED Course  ED Course as of Jun 17 1707 Wed Jun 17, 2020 1657  results reviewed with patient. Patient aware to follow up with Dr. Griggs on Friday at 3:45 PM for continued evaluation of his gallbladder.     []   [id number] Juan Miguel reviewed.     [SH]      ED Course User Index  [SH] Antonia Mccloud " MAGDY Rick                                           [facility]    Final diagnoses:   Gallstones   Adenomyomatosis of gallbladder            Antonia Mccloud APRN  06/17/20 4099

## 2020-06-23 ENCOUNTER — OFFICE VISIT (OUTPATIENT)
Dept: SURGERY | Facility: CLINIC | Age: 25
End: 2020-06-23

## 2020-06-23 VITALS
SYSTOLIC BLOOD PRESSURE: 118 MMHG | DIASTOLIC BLOOD PRESSURE: 86 MMHG | TEMPERATURE: 97.1 F | WEIGHT: 160.2 LBS | HEIGHT: 72 IN | BODY MASS INDEX: 21.7 KG/M2 | HEART RATE: 79 BPM

## 2020-06-23 DIAGNOSIS — K81.9 CHOLECYSTITIS: Primary | ICD-10-CM

## 2020-06-23 PROCEDURE — 99213 OFFICE O/P EST LOW 20 MIN: CPT | Performed by: SURGERY

## 2020-06-23 NOTE — PATIENT INSTRUCTIONS

## 2020-06-23 NOTE — PROGRESS NOTES
25-year-old male who presents with recurring right upper quadrant pain.  Patient underwent a laparoscopic subtotal cholecystectomy back in March 2018.  He says the pain that he is having is not as severe as it was at that time but it is similar.  Patient's presented to emergency room multiple times now with this same complaint.  CT scan as well as ultrasound demonstrate likely a remnant of his gallbladder is present with a gallstone present.  Ultrasound comments that the gallbladder wall is 3.5 mm  thick and may have adenomyomatosis.  Common bile duct was 6.3 mm.  LFTs were within normal limits.  CT scan was unremarkable for any acute findings.  At the time the patient underwent subtotal cholecystectomy the thickness of the gallbladder wall was approximately a centimeter and there was a stone wedged in the neck of the gallbladder with obliteration of the cystic duct that was not able to be removed.  Patient stated he was much better after the surgery.  His drain came out appropriately and did not have any problems here until the last few months.  No history of jaundice no history of pancreatitis.  Most recent duct drug screen was positive for marijuana and amphetamines it was negative for methamphetamines.  He denies taking any medications other than what the emergency room gave him.  Pain is described as intermittent is in the right upper quadrant is worse with eating.    Vitals:    06/23/20 0923   BP: 118/86   Pulse: 79   Temp: 97.1 °F (36.2 °C)       Allergies:   Allergies   Allergen Reactions   • Lemon Extract [Flavoring Agent] Anaphylaxis   • Penicillins Rash       Home Medications:  Prior to Admission medications    Medication Sig Start Date End Date Taking? Authorizing Provider   albuterol 108 (90 Base) MCG/ACT inhaler Inhale 2 puffs Every 6 (Six) Hours As Needed for Shortness of Air. 12/2/18   Kenneth Kohli MD   dicyclomine (BENTYL) 20 MG tablet Take 1 tablet by mouth Every 6 (Six) Hours As Needed (abdominal  pain). 6/17/20   Antonia Mccloud APRN   HYDROcodone-acetaminophen (NORCO) 5-325 MG per tablet Take 1 tablet by mouth Every 6 (Six) Hours As Needed for Moderate Pain . 6/17/20   Antonia Mccloud APRN   ondansetron ODT (ZOFRAN-ODT) 4 MG disintegrating tablet Place 1 tablet on the tongue Every 6 (Six) Hours As Needed for Nausea or Vomiting. 6/17/20   Antonia Mccloud APRN       Social History     Socioeconomic History   • Marital status: Single     Spouse name: Not on file   • Number of children: Not on file   • Years of education: Not on file   • Highest education level: Not on file   Tobacco Use   • Smoking status: Current Every Day Smoker     Packs/day: 0.50     Years: 5.00     Pack years: 2.50     Types: Cigarettes   • Smokeless tobacco: Former User     Types: Chew   Substance and Sexual Activity   • Alcohol use: No     Frequency: Never     Comment: occasionally   • Drug use: Yes     Types: Marijuana     Comment: occasionally   • Sexual activity: Defer       Past Medical History:   Diagnosis Date   • ADHD (attention deficit hyperactivity disorder)     as a child   • Scoliosis deformity of spine    • Torsion of testis        Family History   Problem Relation Age of Onset   • Hypertension Mother    • Diabetes Paternal Grandmother        Past Surgical History:   Procedure Laterality Date   • CHOLECYSTECTOMY N/A 3/30/2018    Procedure: SUBTOTAL CHOLECYSTECTOMY LAPAROSCOPIC;  Surgeon: Ras Griggs MD;  Location: [facility];  Service: General   • HERNIA REPAIR  1994    (1)    Bilateral inguinal hernia repair    • ORCHIECTOMY      Right scrotal exploration. Right detorsion. Left orchiopexy. Removal of bilateral appendage epididymis. Right orchiopexy. After removal of appendage testis and epididymis, right orchiectomy   • TESTICLE TORSION REPAIR         Review of systems  Denies chest pain  Denies shortness of breath  Does not take any anticoagulants  No new medications  No bleeding  issues  No seizures  No asthma        Alert appropriate nontoxic nonicteric  Moves around easily  Lungs clear  Heart regular rate and rhythm  Neck without any obvious masses  Abdomen soft without any masses or hernias mildly tender in right upper quadrant clearly no peritoneal signs  Extremities unremarkable  Skin warm and dry    Assessment and plan  Remnant of gallbladder with stone present status post subtotal cholecystectomy 2 years ago.  Recommend MRCP to better identify anatomy.  Patient will likely need to have this removed.  We will discuss this further after the MRCP.

## 2020-07-01 ENCOUNTER — HOSPITAL ENCOUNTER (OUTPATIENT)
Dept: MRI IMAGING | Facility: HOSPITAL | Age: 25
Discharge: HOME | End: 2020-07-01
Admitting: SURGERY

## 2020-07-01 DIAGNOSIS — K81.9 CHOLECYSTITIS: ICD-10-CM

## 2020-07-01 PROCEDURE — 74183 MRI ABD W/O CNTR FLWD CNTR: CPT

## 2020-07-01 PROCEDURE — 25010000002 GADOTERIDOL PER 1 ML: Performed by: SURGERY

## 2020-07-01 PROCEDURE — A9579 GAD-BASE MR CONTRAST NOS,1ML: HCPCS | Performed by: SURGERY

## 2020-07-01 RX ADMIN — GADOTERIDOL 16 ML: 279.3 INJECTION, SOLUTION INTRAVENOUS at 07:50

## 2021-08-30 ENCOUNTER — HOSPITAL ENCOUNTER (EMERGENCY)
Facility: HOSPITAL | Age: 26
Discharge: HOME | End: 2021-08-30

## 2021-08-30 VITALS
OXYGEN SATURATION: 98 % | SYSTOLIC BLOOD PRESSURE: 138 MMHG | HEART RATE: 120 BPM | TEMPERATURE: 98 F | DIASTOLIC BLOOD PRESSURE: 93 MMHG | RESPIRATION RATE: 20 BRPM

## 2021-08-30 PROCEDURE — 99211 OFF/OP EST MAY X REQ PHY/QHP: CPT

## 2021-08-31 NOTE — ED NOTES
stated that  [place] released pt from custody and the  is going to take pt home.  He does not need to be cleared for [facility].  Pt does not want to stay      Cat Georges RN  08/30/21 8117

## 2023-05-15 ENCOUNTER — HOSPITAL ENCOUNTER (EMERGENCY)
Facility: HOSPITAL | Age: 28
Discharge: HOME | End: 2023-05-15
Attending: EMERGENCY MEDICINE | Admitting: EMERGENCY MEDICINE
Payer: MEDICAID

## 2023-05-15 VITALS
HEIGHT: 72 IN | RESPIRATION RATE: 18 BRPM | TEMPERATURE: 97.6 F | SYSTOLIC BLOOD PRESSURE: 108 MMHG | DIASTOLIC BLOOD PRESSURE: 58 MMHG | WEIGHT: 165 LBS | BODY MASS INDEX: 22.35 KG/M2 | OXYGEN SATURATION: 100 % | HEART RATE: 131 BPM

## 2023-05-15 DIAGNOSIS — E86.0 DEHYDRATION: ICD-10-CM

## 2023-05-15 DIAGNOSIS — F41.1 ANXIETY REACTION: Primary | ICD-10-CM

## 2023-05-15 LAB
ALBUMIN SERPL-MCNC: 3.9 G/DL (ref 3.5–5.2)
ALBUMIN/GLOB SERPL: 1.5 G/DL
ALP SERPL-CCNC: 53 U/L (ref 39–117)
ALT SERPL W P-5'-P-CCNC: 11 U/L (ref 1–41)
ANION GAP SERPL CALCULATED.3IONS-SCNC: 16 MMOL/L (ref 5–15)
AST SERPL-CCNC: 14 U/L (ref 1–40)
BASOPHILS # BLD AUTO: 0.1 10*3/MM3 (ref 0–0.2)
BASOPHILS NFR BLD AUTO: 0.9 % (ref 0–1.5)
BILIRUB SERPL-MCNC: 0.2 MG/DL (ref 0–1.2)
BUN SERPL-MCNC: 10 MG/DL (ref 6–20)
BUN/CREAT SERPL: 7.9 (ref 7–25)
CALCIUM SPEC-SCNC: 8.5 MG/DL (ref 8.6–10.5)
CHLORIDE SERPL-SCNC: 108 MMOL/L (ref 98–107)
CO2 SERPL-SCNC: 18 MMOL/L (ref 22–29)
CREAT SERPL-MCNC: 1.26 MG/DL (ref 0.76–1.27)
DEPRECATED RDW RBC AUTO: 39 FL (ref 37–54)
EGFRCR SERPLBLD CKD-EPI 2021: 79.7 ML/MIN/1.73
EOSINOPHIL # BLD AUTO: 0.21 10*3/MM3 (ref 0–0.4)
EOSINOPHIL NFR BLD AUTO: 1.9 % (ref 0.3–6.2)
ERYTHROCYTE [DISTWIDTH] IN BLOOD BY AUTOMATED COUNT: 12.7 % (ref 12.3–15.4)
GLOBULIN UR ELPH-MCNC: 2.6 GM/DL
GLUCOSE SERPL-MCNC: 101 MG/DL (ref 65–99)
HCT VFR BLD AUTO: 41.1 % (ref 37.5–51)
HGB BLD-MCNC: 13.9 G/DL (ref 13–17.7)
IMM GRANULOCYTES # BLD AUTO: 0.04 10*3/MM3 (ref 0–0.05)
IMM GRANULOCYTES NFR BLD AUTO: 0.4 % (ref 0–0.5)
LYMPHOCYTES # BLD AUTO: 1.19 10*3/MM3 (ref 0.7–3.1)
LYMPHOCYTES NFR BLD AUTO: 10.5 % (ref 19.6–45.3)
MCH RBC QN AUTO: 28.8 PG (ref 26.6–33)
MCHC RBC AUTO-ENTMCNC: 33.8 G/DL (ref 31.5–35.7)
MCV RBC AUTO: 85.3 FL (ref 79–97)
MONOCYTES # BLD AUTO: 0.86 10*3/MM3 (ref 0.1–0.9)
MONOCYTES NFR BLD AUTO: 7.6 % (ref 5–12)
NEUTROPHILS NFR BLD AUTO: 78.7 % (ref 42.7–76)
NEUTROPHILS NFR BLD AUTO: 8.9 10*3/MM3 (ref 1.7–7)
NRBC BLD AUTO-RTO: 0 /100 WBC (ref 0–0.2)
PLATELET # BLD AUTO: 281 10*3/MM3 (ref 140–450)
PMV BLD AUTO: 10.4 FL (ref 6–12)
POTASSIUM SERPL-SCNC: 4.3 MMOL/L (ref 3.5–5.2)
PROT SERPL-MCNC: 6.5 G/DL (ref 6–8.5)
RBC # BLD AUTO: 4.82 10*6/MM3 (ref 4.14–5.8)
SODIUM SERPL-SCNC: 142 MMOL/L (ref 136–145)
WBC NRBC COR # BLD: 11.3 10*3/MM3 (ref 3.4–10.8)

## 2023-05-15 PROCEDURE — 99283 EMERGENCY DEPT VISIT LOW MDM: CPT

## 2023-05-15 PROCEDURE — 36415 COLL VENOUS BLD VENIPUNCTURE: CPT

## 2023-05-15 PROCEDURE — 80053 COMPREHEN METABOLIC PANEL: CPT | Performed by: EMERGENCY MEDICINE

## 2023-05-15 PROCEDURE — 85025 COMPLETE CBC W/AUTO DIFF WBC: CPT | Performed by: EMERGENCY MEDICINE

## 2023-05-15 RX ORDER — LORAZEPAM 2 MG/ML
1 INJECTION INTRAMUSCULAR ONCE
Status: DISCONTINUED | OUTPATIENT
Start: 2023-05-15 | End: 2023-05-15 | Stop reason: HOSPADM

## 2023-05-15 NOTE — Clinical Note
[facility] EMERGENCY DEPARTMENT  [address] [place] [id number]  Phone: [phone]    Javed Hair was seen and treated in our emergency department on 5/15/2023.  He may return to work on 05/16/2023.         Thank you for choosing [facility].    Rafiq Guevara, DO
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
## (undated) DEVICE — SOL IRRIG NACL 1000ML

## (undated) DEVICE — GLV SURG TRIUMPH LT PF LTX 7.5 STRL

## (undated) DEVICE — SUT SILK 2/0 FS BLK 18IN 685G

## (undated) DEVICE — SYR LUERLOK 20CC

## (undated) DEVICE — GOWN,NON-REINFORCED,4XL: Brand: MEDLINE

## (undated) DEVICE — ENDOPOUCH RETRIEVER SPECIMEN RETRIEVAL BAGS: Brand: ENDOPOUCH RETRIEVER

## (undated) DEVICE — LUER-LOK 360°: Brand: CONNECTA, LUER-LOK

## (undated) DEVICE — DRN WND FLT 90D HUBLSS FULL TROC 10MM LF

## (undated) DEVICE — APPL CHLORAPREP W/TINT 26ML ORNG

## (undated) DEVICE — PDS II VLT 0 107CM AG ST3: Brand: ENDOLOOP

## (undated) DEVICE — BLUNT TIP TROCAR: Brand: AUTO SUTURE

## (undated) DEVICE — GOWN,PREVENTION PLUS,XLNG/XXLARGE,STRL: Brand: MEDLINE

## (undated) DEVICE — SKIN AFFIX SURG ADHESIVE 72/CS 0.55ML: Brand: MEDLINE

## (undated) DEVICE — GLV SURG SENSICARE GREEN W/ALOE PF LF 6 STRL

## (undated) DEVICE — HARMONIC ACE +7 LAPAROSCOPIC SHEARS ADVANCED HEMOSTASIS 5MM DIAMETER 36CM SHAFT LENGTH  FOR USE WITH GRAY HAND PIECE ONLY: Brand: HARMONIC ACE

## (undated) DEVICE — SUT VIC 2/0 UR6 27IN VCP602H

## (undated) DEVICE — GLV SURG TRIUMPH LT PF LTX 6.5 STRL

## (undated) DEVICE — SPNG DRN AMD EXCILON 6PLY 4X4IN PK/2

## (undated) DEVICE — SUT MONOCRYL 4/0 PS2 27IN Y426H ETY426H

## (undated) DEVICE — VISUALIZATION SYSTEM: Brand: CLEARIFY

## (undated) DEVICE — GLV SURG SENSICARE GREEN W/ALOE PF LF 7 STRL

## (undated) DEVICE — ENDOPATH XCEL DILATING TIP TROCARS WITH STABILITY SLEEVES: Brand: ENDOPATH XCEL

## (undated) DEVICE — MONOPOLAR METZENBAUM SCISSOR TIP, DISPOSABLE: Brand: MONOPOLAR METZENBAUM SCISSOR TIP, DISPOSABLE

## (undated) DEVICE — STERILE POLYISOPRENE POWDER-FREE SURGICAL GLOVES WITH EMOLLIENT COATING: Brand: PROTEXIS

## (undated) DEVICE — PK LAP CHOLE LF 60

## (undated) DEVICE — RESERVOIR,SUCTION,100CC,SILICONE: Brand: MEDLINE

## (undated) DEVICE — THE KUMAR CATHETER®, USED IN CONJUNCTION WITH KUMAR CHOLANGIOGRAPHY® CLAMP, IS MEANT TO PROVIDE A MEANS OF LAPAROSCOPIC CHOLANGIOGRAPHY. IT COMPRISES A TRANSLUCENT TUBING ( 76 CM. LENGTH AND 16 GA. ) THAT CARRIES A 19 GA., 1.25 CM LONG NEEDLE AT THE END. THE KUMAR CATHETER® IS USED TO PUNCTURE THE HARTMANN'S POUCH OF THE GALLBLADDER FOR BILIARY ACCESS AND / OR ASPIRATION. PRODUCT IS LATEX FREE.: Brand: KUMAR CATHETER®

## (undated) DEVICE — GLV SURG TRIUMPH LT PF LTX 7 STRL